# Patient Record
Sex: FEMALE | Race: WHITE | NOT HISPANIC OR LATINO
[De-identification: names, ages, dates, MRNs, and addresses within clinical notes are randomized per-mention and may not be internally consistent; named-entity substitution may affect disease eponyms.]

---

## 2019-01-07 ENCOUNTER — APPOINTMENT (OUTPATIENT)
Dept: INTERNAL MEDICINE | Facility: CLINIC | Age: 59
End: 2019-01-07

## 2019-02-08 ENCOUNTER — APPOINTMENT (OUTPATIENT)
Dept: INTERNAL MEDICINE | Facility: CLINIC | Age: 59
End: 2019-02-08
Payer: MEDICAID

## 2019-02-08 ENCOUNTER — TRANSCRIPTION ENCOUNTER (OUTPATIENT)
Age: 59
End: 2019-02-08

## 2019-02-08 VITALS
DIASTOLIC BLOOD PRESSURE: 77 MMHG | SYSTOLIC BLOOD PRESSURE: 114 MMHG | WEIGHT: 114 LBS | HEART RATE: 62 BPM | HEIGHT: 65 IN | BODY MASS INDEX: 18.99 KG/M2 | TEMPERATURE: 97.8 F | OXYGEN SATURATION: 98 %

## 2019-02-08 DIAGNOSIS — Z80.0 FAMILY HISTORY OF MALIGNANT NEOPLASM OF DIGESTIVE ORGANS: ICD-10-CM

## 2019-02-08 DIAGNOSIS — Z13.820 ENCOUNTER FOR SCREENING FOR OSTEOPOROSIS: ICD-10-CM

## 2019-02-08 DIAGNOSIS — Z83.3 FAMILY HISTORY OF DIABETES MELLITUS: ICD-10-CM

## 2019-02-08 DIAGNOSIS — M47.812 SPONDYLOSIS W/OUT MYELOPATHY OR RADICULOPATHY, CERVICAL REGION: ICD-10-CM

## 2019-02-08 DIAGNOSIS — M21.619 BUNION OF UNSPECIFIED FOOT: ICD-10-CM

## 2019-02-08 PROCEDURE — 99203 OFFICE O/P NEW LOW 30 MIN: CPT

## 2019-02-11 NOTE — HEALTH RISK ASSESSMENT
[Patient reported mammogram was abnormal] : Patient reported mammogram was abnormal [Patient reported PAP Smear was normal] : Patient reported PAP Smear was normal [Patient reported colonoscopy was normal] : Patient reported colonoscopy was normal [MammogramDate] : 8/6/18 [MammogramComments] : R breast calcifications [PapSmearDate] : 2017 [BoneDensityDate] : never [ColonoscopyDate] : age 52

## 2019-02-11 NOTE — HISTORY OF PRESENT ILLNESS
[FreeTextEntry1] : Establish care, needs prescription for f/u mammogram [de-identified] : Last mammogram (first in a few years) showed calcifications, needs prescription for follow-up mammo, which she has already scheduled.

## 2019-02-12 ENCOUNTER — MEDICATION RENEWAL (OUTPATIENT)
Age: 59
End: 2019-02-12

## 2019-06-28 ENCOUNTER — APPOINTMENT (OUTPATIENT)
Dept: INTERNAL MEDICINE | Facility: CLINIC | Age: 59
End: 2019-06-28
Payer: MEDICAID

## 2019-06-28 VITALS
SYSTOLIC BLOOD PRESSURE: 120 MMHG | BODY MASS INDEX: 19.49 KG/M2 | WEIGHT: 117 LBS | HEIGHT: 65 IN | DIASTOLIC BLOOD PRESSURE: 80 MMHG | TEMPERATURE: 98.2 F | HEART RATE: 57 BPM | OXYGEN SATURATION: 99 %

## 2019-06-28 PROCEDURE — 99214 OFFICE O/P EST MOD 30 MIN: CPT | Mod: 25

## 2019-06-28 PROCEDURE — 36415 COLL VENOUS BLD VENIPUNCTURE: CPT

## 2019-07-23 LAB
ANA SER IF-ACNC: NEGATIVE
CRP SERPL-MCNC: <0.1 MG/DL
ERYTHROCYTE [SEDIMENTATION RATE] IN BLOOD BY WESTERGREN METHOD: 14 MM/HR
RHEUMATOID FACT SER QL: <10 IU/ML

## 2020-03-09 ENCOUNTER — APPOINTMENT (OUTPATIENT)
Dept: INTERNAL MEDICINE | Facility: CLINIC | Age: 60
End: 2020-03-09

## 2020-04-05 ENCOUNTER — RX RENEWAL (OUTPATIENT)
Age: 60
End: 2020-04-05

## 2020-06-05 ENCOUNTER — APPOINTMENT (OUTPATIENT)
Dept: INTERNAL MEDICINE | Facility: CLINIC | Age: 60
End: 2020-06-05

## 2020-07-09 ENCOUNTER — APPOINTMENT (OUTPATIENT)
Dept: DERMATOLOGY | Facility: CLINIC | Age: 60
End: 2020-07-09

## 2020-07-17 ENCOUNTER — APPOINTMENT (OUTPATIENT)
Dept: INTERNAL MEDICINE | Facility: CLINIC | Age: 60
End: 2020-07-17
Payer: MEDICAID

## 2020-07-17 VITALS
SYSTOLIC BLOOD PRESSURE: 114 MMHG | HEIGHT: 65 IN | WEIGHT: 117 LBS | DIASTOLIC BLOOD PRESSURE: 82 MMHG | BODY MASS INDEX: 19.49 KG/M2 | TEMPERATURE: 98.2 F

## 2020-07-17 VITALS — OXYGEN SATURATION: 98 % | HEART RATE: 74 BPM

## 2020-07-17 DIAGNOSIS — M25.542 PAIN IN JOINTS OF RIGHT HAND: ICD-10-CM

## 2020-07-17 DIAGNOSIS — M25.552 PAIN IN LEFT HIP: ICD-10-CM

## 2020-07-17 DIAGNOSIS — R29.898 OTHER SYMPTOMS AND SIGNS INVOLVING THE MUSCULOSKELETAL SYSTEM: ICD-10-CM

## 2020-07-17 DIAGNOSIS — J34.0 ABSCESS, FURUNCLE AND CARBUNCLE OF NOSE: ICD-10-CM

## 2020-07-17 DIAGNOSIS — M25.541 PAIN IN JOINTS OF RIGHT HAND: ICD-10-CM

## 2020-07-17 DIAGNOSIS — Z60.2 PROBLEMS RELATED TO LIVING ALONE: ICD-10-CM

## 2020-07-17 DIAGNOSIS — R92.1 MAMMOGRAPHIC CALCIFICATION FOUND ON DIAGNOSTIC IMAGING OF BREAST: ICD-10-CM

## 2020-07-17 PROCEDURE — 99212 OFFICE O/P EST SF 10 MIN: CPT | Mod: 25

## 2020-07-17 PROCEDURE — 99396 PREV VISIT EST AGE 40-64: CPT | Mod: 25

## 2020-07-17 PROCEDURE — 36415 COLL VENOUS BLD VENIPUNCTURE: CPT

## 2020-07-17 SDOH — SOCIAL STABILITY - SOCIAL INSECURITY: PROBLEMS RELATED TO LIVING ALONE: Z60.2

## 2020-07-28 ENCOUNTER — RESULT REVIEW (OUTPATIENT)
Age: 60
End: 2020-07-28

## 2020-07-28 ENCOUNTER — APPOINTMENT (OUTPATIENT)
Dept: ORTHOPEDIC SURGERY | Facility: CLINIC | Age: 60
End: 2020-07-28
Payer: MEDICAID

## 2020-07-28 ENCOUNTER — OUTPATIENT (OUTPATIENT)
Dept: OUTPATIENT SERVICES | Facility: HOSPITAL | Age: 60
LOS: 1 days | End: 2020-07-28
Payer: COMMERCIAL

## 2020-07-28 ENCOUNTER — TRANSCRIPTION ENCOUNTER (OUTPATIENT)
Age: 60
End: 2020-07-28

## 2020-07-28 VITALS
BODY MASS INDEX: 19.16 KG/M2 | HEIGHT: 65 IN | DIASTOLIC BLOOD PRESSURE: 70 MMHG | TEMPERATURE: 97.9 F | SYSTOLIC BLOOD PRESSURE: 120 MMHG | WEIGHT: 115 LBS

## 2020-07-28 PROCEDURE — 73502 X-RAY EXAM HIP UNI 2-3 VIEWS: CPT

## 2020-07-28 PROCEDURE — 99204 OFFICE O/P NEW MOD 45 MIN: CPT

## 2020-07-28 PROCEDURE — 73502 X-RAY EXAM HIP UNI 2-3 VIEWS: CPT | Mod: 26,LT

## 2020-07-28 PROCEDURE — 72020 X-RAY EXAM OF SPINE 1 VIEW: CPT

## 2020-07-28 PROCEDURE — 72020 X-RAY EXAM OF SPINE 1 VIEW: CPT | Mod: 26

## 2020-07-28 NOTE — DISCUSSION/SUMMARY
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

## 2020-07-28 NOTE — REVIEW OF SYSTEMS
[Joint Pain] : joint pain [Joint Swelling] : joint swelling [Joint Stiffness] : joint stiffness [Feeling Tired] : fatigue [Sleep Disturbances] : ~T sleep disturbances [Heartburn] : heartburn [Feeling Weak] : feeling weak [Muscle Weakness] : muscle weakness [Negative] : Heme/Lymph

## 2020-07-28 NOTE — CONSULT LETTER
[Dear  ___] : Dear  [unfilled], [Consult Letter:] : I had the pleasure of evaluating your patient, [unfilled]. [Consult Closing:] : Thank you very much for allowing me to participate in the care of this patient.  If you have any questions, please do not hesitate to contact me. [Please see my note below.] : Please see my note below. [Sincerely,] : Sincerely, [FreeTextEntry3] : Tavo Sahu MD\par Orthopaedic Surgery - Adult Reconstruction\par [facility] [place]\par [address] [place] [address]\par p. [phone]\par f. [phone]

## 2020-07-28 NOTE — HISTORY OF PRESENT ILLNESS
[de-identified] : 59y/o female presenting with left hip pain of about 2-3 years' duration. No history of injury or other inciting event that she can recall. Pain mostly in the groin, exacerbated by activity but also with occasional nighttime awakenings. Does her own HEP consisting of walking, biking, and gentle yoga. Not a believer in medications. No other invasive treatments. No low back pain or radiating symptoms. Unlimited exercise tolerance. She does report the progressive development of shin muscle atrophy and loss of active ankle dorsiflexion over the past 6 months. No numbness/tingling. \par \par She is an artist. Only significant known PMH is anxiety on citalopram. [Worsening] : worsening [4] : a current pain level of 4/10 [Walking] : worsened by walking [Exercise Regimen] : relieved by exercise regimen [Hip Movement] : worsened by hip movement [de-identified] : achy, sometimes sharp [Rest] : relieved by rest

## 2020-07-28 NOTE — PHYSICAL EXAM
[de-identified] : General appearance: well nourished and hydrated, pleasant, alert and oriented x 3, cooperative.  \par HEENT: normocephalic, EOM intact, wearing mask, external auditory canal clear.  \par Cardiovascular: no lower leg edema, no varicosities, dorsalis pedis pulses palpable and symmetric.  \par Lymphatics: no palpable lymphadenopathy, no lymphedema.  \par Neurologic: sensation is normal, 4/5 weakness in ankle dorsiflexion otherwise 5/5 UE/LE muscle strength, patella tendon reflexes present and symmetric.  \par Dermatologic: skin moist, warm, no rash.  \par Spine: cervical spine with normal lordosis and painless range of motion, thoracic spine with normal kyphosis and painless range of motion, lumbosacral spine with normal lordosis and painless range of motion.  No tenderness to palpation along midline spine and paraspinal musculature.  Sacroiliac joints nontender bilaterally. Negative SLR and crossed SLR tests bilaterally.\par Gait: mild steppage, no antalgia.\par \par Left hip:\par - Skin intact, no scars or other prior surgical incisions noted\par - No swelling/ecchymosis\par - No specific tenderness on palpation\par - ROM: 100 flexion, 0 extension, 20 adduction, 40 abduction, 10 internal rotation, 60 external rotation\par - KENIA painful anteriorly\par - FADIR painful anteriorly\par - Dean negative\par - Stinchfield positive\par - Flexor power 5/5\par - Abductor power 5/5\par \par Right hip:\par - Skin intact, no scars or other prior surgical incisions noted\par - No swelling/ecchymosis\par - No specific tenderness on palpation\par - ROM: 120 flexion, 0 extension, 20 adduction, 50 abduction, 15 internal rotation, 75 external rotation\par - KENIA painless\par - FADIR painless\par - Dean negative\par - Stinchfield negative\par - Flexor power 5/5\par - Abductor power 5/5\par \par Bilateral legs/ankles: atrophy noted of the anterior compartments. Unable to active dorsiflex ankles to neutral; cavus foot positioning noted when attempted. No leg/ankle pain. [de-identified] : A lateral view of the lumbosacral spine, weightbearing AP pelvis, and 2 additional views (frog lateral and false profile) of the right hip were obtained today.\par \par The spine demonstrates normal sagittal alignment without evidence of listhesis. There is mild multilevel spondylosis with associated facet arthrosis.\par \par There is no pelvic obliquity.\par \par The bilateral hips demonstrate normal alignment. There is mild right hip arthritis and moderate left hip arthritis. In both sides there is a centrally erosive pattern. Both acetabuli appear mildly dysplastic, more so the left. There is no radiographic right hip osteonecrosis. Left hip with subtle femoral head subchondral collapse. \par \par The bilateral sacroiliac joints appear normal without arthrosis.

## 2020-07-29 ENCOUNTER — APPOINTMENT (OUTPATIENT)
Dept: ORTHOPEDIC SURGERY | Facility: CLINIC | Age: 60
End: 2020-07-29

## 2020-07-30 ENCOUNTER — APPOINTMENT (OUTPATIENT)
Dept: ORTHOPEDIC SURGERY | Facility: CLINIC | Age: 60
End: 2020-07-30
Payer: MEDICAID

## 2020-07-30 VITALS — TEMPERATURE: 95.9 F

## 2020-07-30 PROCEDURE — 72100 X-RAY EXAM L-S SPINE 2/3 VWS: CPT

## 2020-07-30 PROCEDURE — 99214 OFFICE O/P EST MOD 30 MIN: CPT

## 2020-07-30 NOTE — PHYSICAL EXAM
[de-identified] : General: No acute distress, conversant, well-nourished.\par Head: Normocephalic, atraumatic\par Neck: trachea midline, FROM\par Heart: normotensive and normal rate and rhythm\par Lungs: No labored breathing\par Skin: No abrasions, no rashes, no edema\par Psych: Alert and oriented to person, place and time\par Extremities: no peripheral edema or digital cyanosis\par Gait: Steppage gait.  Difficulty with tandem gait. \par Vascular: warm and well perfused distally, palpable distal pulses\par \par MSK:\par Spine: \par No tenderness to palpation.  No step-off, no deformity.\par \par NEURO:\par Sensation \par          Left           \par C5     2/2               \par C6     2/2               \par C7     2/2               \par C8     2/2              \par T1     2/2             \par \par          Right         \par C5     2/2               \par C6     2/2               \par C7     2/2               \par C8     2/2              \par T1     2/2      \par \par Motor: \par                                                Left             \par C5 (deltoid abduction)             5/5               \par C6 (biceps flexion)                   5/5                \par C7 (triceps extension)             5/5               \par C8 (finger flexion)                     5/5               \par T1 (interosseous)                     5/5           \par \par                                                Right           \par C5 (deltoid abduction)             5/5               \par C6 (biceps flexion)                   5/5                \par C7 (triceps extension)             5/5               \par C8 (finger flexion)                     5/5               \par T1 (interosseous)                     5/5                     \par \par Sensation \par Left L2  -  2/2            \par Left L3  -  2/2\par Left L4  -  2/2\par Left L5  -  2/2\par Left S1  -  2/2\par \par Right L2  -  2/2            \par Right L3  -  2/2\par Right L4  -  2/2\par Right L5  -  2/2\par Right S1  -  2/2\par \par Motor: \par Left L2 (hip flexion)                            5/5                \par Left L3 (knee extension)                   5/5                \par Left L4 (ankle dorsiflexion)                 5/5                \par Left L5 (long toe extensor)                3/5                \par Left S1 (ankle plantar flexion)           3/5\par \par Right L2 (hip flexion)                            5/5                \par Right L3 (knee extension)                   5/5                \par Right L4 (ankle dorsiflexion)                 5/5                \par Right L5 (long toe extensor)                3/5                \par Right S1 (ankle plantar flexion)           3/5\par \par Reflexes: Normal and symmetric\par Negative Spurling’s test.  Negative Murphy’s reflex.  \par Negative clonus.  Down-going Babinski.\par  [de-identified] : Lumbar AP, lateral, flexion and extension radiographs obtained in the office today shows no fracture or dislocation.  Mild lumbar spondylosis.  No instability on dynamic series.  Mild dextroscoliosis.   \par

## 2020-07-30 NOTE — CONSULT LETTER
[Dear  ___] : Dear  [unfilled], [Please see my note below.] : Please see my note below. [Consult Letter:] : I had the pleasure of evaluating your patient, [unfilled]. [Consult Closing:] : Thank you very much for allowing me to participate in the care of this patient.  If you have any questions, please do not hesitate to contact me. [Sincerely,] : Sincerely, [FreeTextEntry3] : Chauncey Gillespie MD\par Ortho Spine Surgery\par [facility]\par [address] [facility] [address]\[place] [address]\par p: [phone]\par f: [phone]

## 2020-07-30 NOTE — HISTORY OF PRESENT ILLNESS
[de-identified] : 60 year old female referred by Dr. Sahu with bilateral lower extremity paresthesias and bilateral foot drop.  She reports these symptoms have developed over the last 6 months to 1 year.  She is a  by trade and has not noticed any fine motor deficits or upper extremity symptoms. She has bit been taking any medications for pain.  She has hip pain for which she sees Dr. Sahu.  She also complains of low back pain.  She denies any saddle anesthesia or bowel/bladder symptoms

## 2020-07-30 NOTE — CONSULT LETTER
[Dear  ___] : Dear  [unfilled], [Consult Letter:] : I had the pleasure of evaluating your patient, [unfilled]. [Please see my note below.] : Please see my note below. [Consult Closing:] : Thank you very much for allowing me to participate in the care of this patient.  If you have any questions, please do not hesitate to contact me. [Sincerely,] : Sincerely, [FreeTextEntry3] : Chauncey Gillespie MD\par Ortho Spine Surgery\par [facility]\par [address] [facility] [address]\[place] [address]\par p: [phone]\par f: [phone]

## 2020-07-30 NOTE — PHYSICAL EXAM
[de-identified] : General: No acute distress, conversant, well-nourished.\par Head: Normocephalic, atraumatic\par Neck: trachea midline, FROM\par Heart: normotensive and normal rate and rhythm\par Lungs: No labored breathing\par Skin: No abrasions, no rashes, no edema\par Psych: Alert and oriented to person, place and time\par Extremities: no peripheral edema or digital cyanosis\par Gait: Steppage gait.  Difficulty with tandem gait. \par Vascular: warm and well perfused distally, palpable distal pulses\par \par MSK:\par Spine: \par No tenderness to palpation.  No step-off, no deformity.\par \par NEURO:\par Sensation \par          Left           \par C5     2/2               \par C6     2/2               \par C7     2/2               \par C8     2/2              \par T1     2/2             \par \par          Right         \par C5     2/2               \par C6     2/2               \par C7     2/2               \par C8     2/2              \par T1     2/2      \par \par Motor: \par                                                Left             \par C5 (deltoid abduction)             5/5               \par C6 (biceps flexion)                   5/5                \par C7 (triceps extension)             5/5               \par C8 (finger flexion)                     5/5               \par T1 (interosseous)                     5/5           \par \par                                                Right           \par C5 (deltoid abduction)             5/5               \par C6 (biceps flexion)                   5/5                \par C7 (triceps extension)             5/5               \par C8 (finger flexion)                     5/5               \par T1 (interosseous)                     5/5                     \par \par Sensation \par Left L2  -  2/2            \par Left L3  -  2/2\par Left L4  -  2/2\par Left L5  -  2/2\par Left S1  -  2/2\par \par Right L2  -  2/2            \par Right L3  -  2/2\par Right L4  -  2/2\par Right L5  -  2/2\par Right S1  -  2/2\par \par Motor: \par Left L2 (hip flexion)                            5/5                \par Left L3 (knee extension)                   5/5                \par Left L4 (ankle dorsiflexion)                 5/5                \par Left L5 (long toe extensor)                3/5                \par Left S1 (ankle plantar flexion)           3/5\par \par Right L2 (hip flexion)                            5/5                \par Right L3 (knee extension)                   5/5                \par Right L4 (ankle dorsiflexion)                 5/5                \par Right L5 (long toe extensor)                3/5                \par Right S1 (ankle plantar flexion)           3/5\par \par Reflexes: Normal and symmetric\par Negative Spurling’s test.  Negative Murphy’s reflex.  \par Negative clonus.  Down-going Babinski.\par  [de-identified] : Lumbar AP, lateral, flexion and extension radiographs obtained in the office today shows no fracture or dislocation.  Mild lumbar spondylosis.  No instability on dynamic series.  Mild dextroscoliosis.   \par

## 2020-07-30 NOTE — ASSESSMENT
[FreeTextEntry1] : 60 year old female presents with low back pain and paresthesias into her lower extremities with worsening foot drops developing over last 6 months to 1 year.  She was given a referral for a lumbar MRI.  She already has a referral for PT and to obtain foot drop splints.  She has an appointment with neurologist Dr. Makayla Saavedra.  She will followup once her MRI has been completed and after her consultation with Dr. Saavedra.  She knows to call with any questions or concerns or if her symptoms acutely worsen.

## 2020-07-30 NOTE — HISTORY OF PRESENT ILLNESS
[de-identified] : 60 year old female referred by Dr. Sahu with bilateral lower extremity paresthesias and bilateral foot drop.  She reports these symptoms have developed over the last 6 months to 1 year.  She is a  by trade and has not noticed any fine motor deficits or upper extremity symptoms. She has bit been taking any medications for pain.  She has hip pain for which she sees Dr. Sahu.  She also complains of low back pain.  She denies any saddle anesthesia or bowel/bladder symptoms

## 2020-08-07 ENCOUNTER — APPOINTMENT (OUTPATIENT)
Dept: NEUROLOGY | Facility: CLINIC | Age: 60
End: 2020-08-07
Payer: MEDICAID

## 2020-08-07 VITALS
BODY MASS INDEX: 19.16 KG/M2 | DIASTOLIC BLOOD PRESSURE: 77 MMHG | SYSTOLIC BLOOD PRESSURE: 119 MMHG | TEMPERATURE: 98.2 F | HEART RATE: 58 BPM | OXYGEN SATURATION: 99 % | WEIGHT: 115 LBS | HEIGHT: 65 IN

## 2020-08-07 PROCEDURE — 99204 OFFICE O/P NEW MOD 45 MIN: CPT

## 2020-08-07 NOTE — DISCUSSION/SUMMARY
[FreeTextEntry1] : Bilateral foot drop.\par \par Etiology not clear to me.  Would be strange to have bilateral peroneal neuropathies especially with intact eversion.  No other LE weakness or sensory issues to suggest myelopathy or radiculopathy.  ?Myopathy.\par \par -L spine MRI\par -EMG/NCS\par -Agree with PT/foot splints

## 2020-08-07 NOTE — REVIEW OF SYSTEMS
[FreeTextEntry1] : see intake sheet, reviewed with patient, all unchecked systems reviewed and negative

## 2020-08-07 NOTE — PHYSICAL EXAM
[FreeTextEntry1] : Physical Exam\par Constitutional: no apparent distress\par Psychiatric: normal affect, euthyhmic, alert and oriented x 3\par HEENT: moist mucous membranes, oropharynx clear\par Neck: supple, no lymphadenopathy\par Respiratory: clear to auscultation bilaterally, no crackles or wheezing\par Cardiovascular: regular rate and rhythm, normal S1/S2, no murmurs, no edema\par GI: soft, non-tender, non-distended, bowel sounds present; no hepatosplenomegaly on palpation\par Musculoskeletal: extremities warm, well-perfused, normal range of motion.  +tibialis anterior atrophy bilaterally.\par Skin: no rashes, bruises, or lesions\par \par Neurologic Exam:\par Mental Status: awake and alert, speech fluent and prosodic with no paraphasic errors\par Cranial Nerves: I: deferred; II: pupils equal round and reactive, visual fields full to confrontation III, IV, VI: extraocular movements full with no nystagmus; V: facial sensation intact and symmetric; VII: facial power symmetric; VIII: hearing intact to voice; IX/X: palate elevates symmetrically, no dysarthria; XI: shoulder shrug symmetric; XII: tongue protrudes midline\par Motor: normal bulk and tone, no orbiting or pronator drift, power 5/5 to confrontation throughout including shoulder abduction, elbow flexion and extension, wrist flexion and extension, hip flexion, knee flexion and extension.  Dorsiflexion 4/5 bilaterally, inversion 5/5 bilaterally, eversion 4/5 on L and 5/5 on R, plantarflexion 5/5 bilaterally.\par Sensation: intact to light touch, vibration, temperature, and proprioception in distal lower extremities bilaterally\par Coordination: finger-nose-finger intact bilaterally\par Reflexes: 2+ biceps, triceps, brachioradialis, patella.  Toes downgoing bilaterally, no clonus\par Gait: narrow base, normal stance, steppage gait, able to walk on heels but not toes\par

## 2020-08-07 NOTE — CONSULT LETTER
[Consult Letter:] : I had the pleasure of evaluating your patient, [unfilled]. [Dear  ___] : Dear  [unfilled], [Consult Closing:] : Thank you very much for allowing me to participate in the care of this patient.  If you have any questions, please do not hesitate to contact me. [Please see my note below.] : Please see my note below. [Sincerely,] : Sincerely, [FreeTextEntry3] : Makayla Saavedra MD [FreeTextEntry2] : Tavo Sahu MD

## 2020-08-07 NOTE — HISTORY OF PRESENT ILLNESS
[FreeTextEntry1] : 60-year-old woman referred by Dr. Sahu for evaluation of bilateral foot drop.  \par \par She is unsure exactly when the symptoms started but thinks that it was within the past year.  She first noticed that her feet made a "slapping" sound when she was wearing flat shoes but not heels, and has been wearing heels most of the time as a result. More recently she has noticed that she cannot lift her feet.  She thought that this was related to longstanding hip pain so she presented to Dr. Sahu for evaluation, who referred her here.\par \par Occasional mild back pain but non-radiating.  Occasional neck pain, also non-radiating.  No weakness in the proximal legs, no difficulty walking other than the slapping issue.  No numbness, tingling in arms or legs.

## 2020-08-13 ENCOUNTER — APPOINTMENT (OUTPATIENT)
Dept: ORTHOPEDIC SURGERY | Facility: CLINIC | Age: 60
End: 2020-08-13
Payer: MEDICAID

## 2020-08-13 PROCEDURE — 99213 OFFICE O/P EST LOW 20 MIN: CPT | Mod: 95

## 2020-08-17 ENCOUNTER — APPOINTMENT (OUTPATIENT)
Dept: NEUROLOGY | Facility: CLINIC | Age: 60
End: 2020-08-17
Payer: MEDICAID

## 2020-08-17 VITALS
HEART RATE: 60 BPM | TEMPERATURE: 98.6 F | BODY MASS INDEX: 19.33 KG/M2 | SYSTOLIC BLOOD PRESSURE: 124 MMHG | DIASTOLIC BLOOD PRESSURE: 75 MMHG | WEIGHT: 116 LBS | RESPIRATION RATE: 16 BRPM | OXYGEN SATURATION: 100 % | HEIGHT: 65 IN

## 2020-08-17 PROCEDURE — 95912 NRV CNDJ TEST 11-12 STUDIES: CPT

## 2020-08-17 PROCEDURE — 99214 OFFICE O/P EST MOD 30 MIN: CPT | Mod: 25

## 2020-08-17 PROCEDURE — 95885 MUSC TST DONE W/NERV TST LIM: CPT

## 2020-08-17 NOTE — PROCEDURE
[FreeTextEntry1] : Nerve Conduction and Electromyography Report [FreeTextEntry3] : Electro Physiologic Findings:\par \par Limb temperature was monitored and maintained at approximately 30 – 34° C in the lower extremities, and 32 – 36° C in the upper extremities.\par \par The right superficial fibular sensory amplitude was low, while the left was normal. The left sural and radial sensory responses were normal. \par \par The left median motor response was normal. The fibular motor responses at the EDB were largely unremarkable, other than borderline slowing below the fibular head. The fibular motor amplitudes at the tibialis anterior were low bilaterally. The tibial motor responses were normal bilaterally and symmetric. \par \par The right fibular F-waves were mildly prolonged; while the left was normal. The left median and bilateral tibial F-waves were normal. \par \par Needle electromyography was performed on select left upper and bilateral lower extremity appendicular muscles. In the bilateral tibialis anterior muscles, there was significant spontaneous activity, decreased insertional activity, and small motor units with early/full recruitment pattern. The other muscles tested were all normal without evidence of myopathy or active or chronic denervation. 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 \par \par There was also a suggestion of a superimposed mild entrapment of the right common fibular nerve below the knee as well, although that is not likely significantly contributing to the weakness in dorsiflexion. \par \par There was no evidence of polyneuropathy, or myopathy in any other muscles tested.

## 2020-08-17 NOTE — ASSESSMENT
[FreeTextEntry1] : 60 year old female with low back pain and bilateral foot drop.  She had a recent lumbar MRI that showed no compression of the neural elements. She saw Dr. Makayla Saavedra a neurologist who referred her for an EMG by Dr. Fierro.  There is no indication for spine intervention.  The patient will followup once she has completed her EMG with Dr. Fierro.  She knows to call with any questions or concerns or if her symptoms acutely worsen.

## 2020-08-17 NOTE — HISTORY OF PRESENT ILLNESS
[FreeTextEntry1] : Referred by Dr. Saavedra for bilateral foot weakness, mostly in dorsiflexion of ankles\par Present for at least 6 months and probably closer to a year\par She also has atrophy in the anterior lower leg but she didn't notice it until it was pointe out by Dr. Saavedra\par She has mild low back pain without radiation\par There is no definite numbness or tingling although she does say the feet "feel funny"\par She also has some mild weakness in the arms although not as much as the feet\par \par No family history of neuromuscular disease \par \par MRI L Spine reviewed - unremarkable\par \par ROS: no urinary incontinence; + hip pain

## 2020-08-17 NOTE — PHYSICAL EXAM
[FreeTextEntry1] : Gen: appears well, well-nourished, no acute distress\par \par MS: awake, alert, oriented, speech fluent, comprehension intact, good fund of knowledge, recent and remote memory intact, attention intact\par \par CN: PERRL, EOMI, visual fields full, facial strength and sensation intact and symmetric, palate elevation symmetric, tongue midline, no tongue atrophy or fasciculations\par \par Motor: Moderate atrophy of tibialis anterior b/l\par Ankle dorsiflexion 4/5, EHL and EDB 4/5, otherwise 5/5 throughout \par Normal tone \par \par Sensory: Vibration mildly reduced at toes > ankles\par \par Reflexes: 2+ symmetric throughout, no Murphy’s sign, plantar responses flexor bilaterally\par \par Coordination: no dysmetria on finger to nose, Romberg negative\par \par Gait: mildly steppage, unable to walk on heels

## 2020-08-17 NOTE — ASSESSMENT
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

## 2020-08-17 NOTE — HISTORY OF PRESENT ILLNESS
[de-identified] : 60 year old female bilateral lower extremity paresthesias and bilateral foot drop here for followup.  Since her last visit she has seen a neurologist, Dr. Makayla Saavedra who has referred her for an EMG by Dr. Fierro.  The patient continues to have low back pain but denies radicular pain, saddle anesthesia or bowel/bladder symptoms.   She has not been taking any medications for pain.  He symptoms have progressed over 6 months to 1 year. She is here to review her lumbar MRI.

## 2020-08-17 NOTE — PHYSICAL EXAM
[de-identified] : Lumbar MRI (8/11/20): No acute fracture or dislocation.  5 degrees of dextroscoliosis.  Lumbar spondylosis with multilevel disc bulges. No spinal stenosis.  Facet arthropathy.  \par \par Lumbar AP, lateral, flexion and extension radiographs (8/7/20) shows no fracture or dislocation.  Mild lumbar spondylosis.  No instability on dynamic series.  Mild dextroscoliosis.   \par  [de-identified] : General: No acute distress, conversant, well-nourished.\par Head: Normocephalic, atraumatic\par Neck: trachea midline, FROM\par Heart: normotensive and normal rate and rhythm\par Lungs: No labored breathing\par Skin: No abrasions, no rashes, no edema\par Psych: Alert and oriented to person, place and time\par Extremities: no peripheral edema or digital cyanosis\par Gait: Steppage gait.  Difficulty with tandem gait. \par Vascular: warm and well perfused distally, palpable distal pulses\par \par MSK:\par Spine: \par No tenderness to palpation.  No step-off, no deformity.\par \par NEURO:\par Sensation \par          Left           \par C5     2/2               \par C6     2/2               \par C7     2/2               \par C8     2/2              \par T1     2/2             \par \par          Right         \par C5     2/2               \par C6     2/2               \par C7     2/2               \par C8     2/2              \par T1     2/2      \par \par Motor: \par                                                Left             \par C5 (deltoid abduction)             5/5               \par C6 (biceps flexion)                   5/5                \par C7 (triceps extension)             5/5               \par C8 (finger flexion)                     5/5               \par T1 (interosseous)                     5/5           \par \par                                                Right           \par C5 (deltoid abduction)             5/5               \par C6 (biceps flexion)                   5/5                \par C7 (triceps extension)             5/5               \par C8 (finger flexion)                     5/5               \par T1 (interosseous)                     5/5                     \par \par Sensation \par Left L2  -  2/2            \par Left L3  -  2/2\par Left L4  -  2/2\par Left L5  -  2/2\par Left S1  -  2/2\par \par Right L2  -  2/2            \par Right L3  -  2/2\par Right L4  -  2/2\par Right L5  -  2/2\par Right S1  -  2/2\par \par Motor: \par Left L2 (hip flexion)                            5/5                \par Left L3 (knee extension)                   5/5                \par Left L4 (ankle dorsiflexion)                 5/5                \par Left L5 (long toe extensor)                3/5                \par Left S1 (ankle plantar flexion)           3/5\par \par Right L2 (hip flexion)                            5/5                \par Right L3 (knee extension)                   5/5                \par Right L4 (ankle dorsiflexion)                 5/5                \par Right L5 (long toe extensor)                3/5                \par Right S1 (ankle plantar flexion)           3/5\par \par Reflexes: Normal and symmetric\par Negative Spurling’s test.  Negative Murphy’s reflex.  \par Negative clonus.  Down-going Babinski.\par

## 2020-08-19 ENCOUNTER — APPOINTMENT (OUTPATIENT)
Dept: ORTHOPEDIC SURGERY | Facility: CLINIC | Age: 60
End: 2020-08-19

## 2020-09-08 ENCOUNTER — TRANSCRIPTION ENCOUNTER (OUTPATIENT)
Age: 60
End: 2020-09-08

## 2020-09-08 ENCOUNTER — APPOINTMENT (OUTPATIENT)
Dept: INTERNAL MEDICINE | Facility: CLINIC | Age: 60
End: 2020-09-08
Payer: MEDICAID

## 2020-09-08 ENCOUNTER — NON-APPOINTMENT (OUTPATIENT)
Age: 60
End: 2020-09-08

## 2020-09-08 VITALS
SYSTOLIC BLOOD PRESSURE: 103 MMHG | HEART RATE: 70 BPM | BODY MASS INDEX: 19.16 KG/M2 | DIASTOLIC BLOOD PRESSURE: 70 MMHG | TEMPERATURE: 99 F | OXYGEN SATURATION: 97 % | WEIGHT: 115 LBS | HEIGHT: 65 IN

## 2020-09-08 DIAGNOSIS — R20.0 ANESTHESIA OF SKIN: ICD-10-CM

## 2020-09-08 DIAGNOSIS — H02.009 UNSPECIFIED ENTROPION OF UNSPECIFIED EYE, UNSPECIFIED EYELID: ICD-10-CM

## 2020-09-08 DIAGNOSIS — R82.90 UNSPECIFIED ABNORMAL FINDINGS IN URINE: ICD-10-CM

## 2020-09-08 LAB
25(OH)D3 SERPL-MCNC: 22.9 NG/ML
ALBUMIN SERPL ELPH-MCNC: 5.2 G/DL
ALP BLD-CCNC: 84 U/L
ALT SERPL-CCNC: 18 U/L
ANION GAP SERPL CALC-SCNC: 15 MMOL/L
APPEARANCE: CLEAR
AST SERPL-CCNC: 27 U/L
BACTERIA: ABNORMAL
BASOPHILS # BLD AUTO: 0.05 K/UL
BASOPHILS NFR BLD AUTO: 0.9 %
BILIRUB SERPL-MCNC: 0.3 MG/DL
BILIRUBIN URINE: NEGATIVE
BLOOD URINE: NORMAL
BUN SERPL-MCNC: 18 MG/DL
CALCIUM SERPL-MCNC: 10 MG/DL
CHLORIDE SERPL-SCNC: 101 MMOL/L
CHOLEST SERPL-MCNC: 214 MG/DL
CHOLEST/HDLC SERPL: 2.4 RATIO
CO2 SERPL-SCNC: 26 MMOL/L
COLOR: YELLOW
CREAT SERPL-MCNC: 0.76 MG/DL
EOSINOPHIL # BLD AUTO: 0.31 K/UL
EOSINOPHIL NFR BLD AUTO: 5.4 %
ESTIMATED AVERAGE GLUCOSE: 105 MG/DL
GLUCOSE QUALITATIVE U: NEGATIVE
GLUCOSE SERPL-MCNC: 100 MG/DL
HBA1C MFR BLD HPLC: 5.3 %
HCT VFR BLD CALC: 43.4 %
HDLC SERPL-MCNC: 90 MG/DL
HGB BLD-MCNC: 12.9 G/DL
HYALINE CASTS: 0 /LPF
IMM GRANULOCYTES NFR BLD AUTO: 0.2 %
KETONES URINE: NEGATIVE
LDLC SERPL CALC-MCNC: 110 MG/DL
LEUKOCYTE ESTERASE URINE: NEGATIVE
LYMPHOCYTES # BLD AUTO: 1.36 K/UL
LYMPHOCYTES NFR BLD AUTO: 23.9 %
MAN DIFF?: NORMAL
MCHC RBC-ENTMCNC: 29 PG
MCHC RBC-ENTMCNC: 29.7 GM/DL
MCV RBC AUTO: 97.5 FL
MICROSCOPIC-UA: NORMAL
MONOCYTES # BLD AUTO: 0.45 K/UL
MONOCYTES NFR BLD AUTO: 7.9 %
NEUTROPHILS # BLD AUTO: 3.52 K/UL
NEUTROPHILS NFR BLD AUTO: 61.7 %
NITRITE URINE: POSITIVE
PH URINE: 5.5
PLATELET # BLD AUTO: 204 K/UL
POTASSIUM SERPL-SCNC: 4.9 MMOL/L
PROT SERPL-MCNC: 7.4 G/DL
PROTEIN URINE: NEGATIVE
RBC # BLD: 4.45 M/UL
RBC # FLD: 13.4 %
RED BLOOD CELLS URINE: 3 /HPF
SARS-COV-2 IGG SERPL IA-ACNC: <0.1 INDEX
SARS-COV-2 IGG SERPL QL IA: NEGATIVE
SODIUM SERPL-SCNC: 142 MMOL/L
SPECIFIC GRAVITY URINE: 1.02
SQUAMOUS EPITHELIAL CELLS: 1 /HPF
TRIGL SERPL-MCNC: 72 MG/DL
TSH SERPL-ACNC: 1.01 UIU/ML
UROBILINOGEN URINE: NORMAL
WBC # FLD AUTO: 5.7 K/UL
WHITE BLOOD CELLS URINE: 6 /HPF

## 2020-09-08 PROCEDURE — 36415 COLL VENOUS BLD VENIPUNCTURE: CPT

## 2020-09-08 PROCEDURE — 93000 ELECTROCARDIOGRAM COMPLETE: CPT

## 2020-09-08 PROCEDURE — 99214 OFFICE O/P EST MOD 30 MIN: CPT | Mod: 25

## 2020-09-09 ENCOUNTER — TRANSCRIPTION ENCOUNTER (OUTPATIENT)
Age: 60
End: 2020-09-09

## 2020-09-15 ENCOUNTER — TRANSCRIPTION ENCOUNTER (OUTPATIENT)
Age: 60
End: 2020-09-15

## 2020-09-15 LAB
ALBUMIN SERPL ELPH-MCNC: 4.7 G/DL
ALP BLD-CCNC: 61 U/L
ALT SERPL-CCNC: 19 U/L
ANION GAP SERPL CALC-SCNC: 10 MMOL/L
APPEARANCE: ABNORMAL
AST SERPL-CCNC: 27 U/L
BACTERIA UR CULT: ABNORMAL
BACTERIA: ABNORMAL
BASOPHILS # BLD AUTO: 0.04 K/UL
BASOPHILS NFR BLD AUTO: 0.9 %
BILIRUB SERPL-MCNC: 0.2 MG/DL
BILIRUBIN URINE: NEGATIVE
BLOOD URINE: NEGATIVE
BUN SERPL-MCNC: 17 MG/DL
CALCIUM SERPL-MCNC: 9.6 MG/DL
CHLORIDE SERPL-SCNC: 106 MMOL/L
CO2 SERPL-SCNC: 29 MMOL/L
COLOR: YELLOW
CREAT SERPL-MCNC: 0.79 MG/DL
EOSINOPHIL # BLD AUTO: 0.34 K/UL
EOSINOPHIL NFR BLD AUTO: 7.9 %
GLUCOSE QUALITATIVE U: NEGATIVE
GLUCOSE SERPL-MCNC: 82 MG/DL
HCT VFR BLD CALC: 37.4 %
HGB BLD-MCNC: 11.5 G/DL
HYALINE CASTS: 0 /LPF
IMM GRANULOCYTES NFR BLD AUTO: 0.5 %
KETONES URINE: NEGATIVE
LEUKOCYTE ESTERASE URINE: ABNORMAL
LYMPHOCYTES # BLD AUTO: 1.45 K/UL
LYMPHOCYTES NFR BLD AUTO: 33.9 %
MAN DIFF?: NORMAL
MCHC RBC-ENTMCNC: 29.3 PG
MCHC RBC-ENTMCNC: 30.7 GM/DL
MCV RBC AUTO: 95.2 FL
MICROSCOPIC-UA: NORMAL
MONOCYTES # BLD AUTO: 0.4 K/UL
MONOCYTES NFR BLD AUTO: 9.3 %
NEUTROPHILS # BLD AUTO: 2.03 K/UL
NEUTROPHILS NFR BLD AUTO: 47.5 %
NITRITE URINE: POSITIVE
PH URINE: 6
PLATELET # BLD AUTO: 180 K/UL
POTASSIUM SERPL-SCNC: 4.1 MMOL/L
PROT SERPL-MCNC: 6.7 G/DL
PROTEIN URINE: NEGATIVE
RBC # BLD: 3.93 M/UL
RBC # FLD: 13.1 %
RED BLOOD CELLS URINE: 3 /HPF
SODIUM SERPL-SCNC: 144 MMOL/L
SPECIFIC GRAVITY URINE: 1.02
SQUAMOUS EPITHELIAL CELLS: 5 /HPF
UROBILINOGEN URINE: NORMAL
WBC # FLD AUTO: 4.28 K/UL
WHITE BLOOD CELLS URINE: 5 /HPF

## 2020-09-25 ENCOUNTER — TRANSCRIPTION ENCOUNTER (OUTPATIENT)
Age: 60
End: 2020-09-25

## 2020-09-29 ENCOUNTER — TRANSCRIPTION ENCOUNTER (OUTPATIENT)
Age: 60
End: 2020-09-29

## 2020-10-16 ENCOUNTER — TRANSCRIPTION ENCOUNTER (OUTPATIENT)
Age: 60
End: 2020-10-16

## 2020-10-16 DIAGNOSIS — Z87.440 PERSONAL HISTORY OF URINARY (TRACT) INFECTIONS: ICD-10-CM

## 2020-10-16 LAB
APPEARANCE: ABNORMAL
BACTERIA UR CULT: ABNORMAL
BACTERIA: ABNORMAL
BILIRUBIN URINE: NEGATIVE
BLOOD URINE: NEGATIVE
COLOR: YELLOW
GLUCOSE QUALITATIVE U: NEGATIVE
HYALINE CASTS: 0 /LPF
KETONES URINE: NEGATIVE
LEUKOCYTE ESTERASE URINE: NEGATIVE
MICROSCOPIC-UA: NORMAL
NITRITE URINE: NEGATIVE
PH URINE: 7.5
PROTEIN URINE: NEGATIVE
RED BLOOD CELLS URINE: 2 /HPF
SPECIFIC GRAVITY URINE: 1.01
SQUAMOUS EPITHELIAL CELLS: 0 /HPF
UROBILINOGEN URINE: NORMAL
WHITE BLOOD CELLS URINE: 7 /HPF

## 2020-10-20 ENCOUNTER — APPOINTMENT (OUTPATIENT)
Dept: ORTHOPEDIC SURGERY | Facility: CLINIC | Age: 60
End: 2020-10-20
Payer: MEDICAID

## 2020-10-20 ENCOUNTER — TRANSCRIPTION ENCOUNTER (OUTPATIENT)
Age: 60
End: 2020-10-20

## 2020-10-20 VITALS — BODY MASS INDEX: 19.16 KG/M2 | HEIGHT: 65 IN | WEIGHT: 115 LBS

## 2020-10-20 PROCEDURE — 99072 ADDL SUPL MATRL&STAF TM PHE: CPT

## 2020-10-20 PROCEDURE — 99214 OFFICE O/P EST MOD 30 MIN: CPT

## 2020-10-20 RX ORDER — SULFAMETHOXAZOLE AND TRIMETHOPRIM 800; 160 MG/1; MG/1
800-160 TABLET ORAL
Qty: 14 | Refills: 0 | Status: DISCONTINUED | COMMUNITY
Start: 2020-09-15 | End: 2020-10-20

## 2020-10-22 ENCOUNTER — TRANSCRIPTION ENCOUNTER (OUTPATIENT)
Age: 60
End: 2020-10-22

## 2020-10-22 NOTE — HISTORY OF PRESENT ILLNESS
[de-identified] : 61y/o female following up for left hip osteoarthritis and bilateral dropfoot gait. Did not do PT or begin with Tylenol/NSAIDs; instead continued with own HEP. Has been experiencing worsening left hip pain, up to 10/10 within the last week. 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 Lumbar MRI did not demonstrate any significant neural element compression. Has not yet obtained AFOs; was confused as to whether they were necessary. Reports that she has been tripping. Generally tries not to walk around outside. Overall frustrated. She is inquiring about left hip corticosteroid injection.

## 2020-10-22 NOTE — PHYSICAL EXAM
[de-identified] : General appearance: well nourished and hydrated, alert and oriented x 3, cooperative. \par HEENT: normocephalic, EOM intact, wearing mask, external auditory canal clear. \par Cardiovascular: no lower leg edema, no varicosities, dorsalis pedis pulses palpable and symmetric. \par Lymphatics: no palpable lymphadenopathy, no lymphedema. \par Neurologic: sensation is normal, 4/5 weakness in ankle dorsiflexion otherwise 5/5 UE/LE muscle strength, patella tendon reflexes present and symmetric. \par Dermatologic: skin moist, warm, no rash. \par Spine: cervical spine with normal lordosis and painless range of motion, thoracic spine with normal kyphosis and painless range of motion, lumbosacral spine with normal lordosis and painless range of motion. No tenderness to palpation along midline spine and paraspinal musculature. Sacroiliac joints nontender bilaterally. Negative SLR and crossed SLR tests bilaterally.\par Gait: mild steppage, no antalgia.\par \par Left hip:\par - No swelling\par - Mild anterior tenderness on palpation\par - ROM: 90 flexion, 0 extension, 15 adduction, 30 abduction, 0 internal rotation, 40 external rotation\par - KENIA painful anteriorly\par - FADIR painful anteriorly\par - Dean negative\par - Stinchfield positive\par - Flexor power 5/5\par - Abductor power 5/5\par \par Right hip:\par - No swelling\par - No specific tenderness on palpation\par - ROM: 130 flexion, 0 extension, 20 adduction, 50 abduction, 15 internal rotation, 75 external rotation\par - KENIA painless\par - FADIR painless\par - Dean negative\par - Stinchfield negative\par - Flexor power 5/5\par - Abductor power 5/5 [de-identified] : No new imaging today

## 2020-10-22 NOTE — DISCUSSION/SUMMARY
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 Fierro\par - HEP encouraged, written AAOS plan given\par - Tylenol + meloxicam as needed for pain; standard precautions reviewed\par - Refer to IR for left hip CSI; standard precautions reviewed\par - Cont f/u with Dr. Fierro\par - F/U with me in 6mo or as needed for persistent or worsening symptoms with repeat pelvic and left hip XRs. \par \par Concerns re: pain management, compliance, and healthcare access should be handled with sensitivity.

## 2020-10-23 ENCOUNTER — TRANSCRIPTION ENCOUNTER (OUTPATIENT)
Age: 60
End: 2020-10-23

## 2020-10-24 ENCOUNTER — LABORATORY RESULT (OUTPATIENT)
Age: 60
End: 2020-10-24

## 2020-10-27 ENCOUNTER — RESULT REVIEW (OUTPATIENT)
Age: 60
End: 2020-10-27

## 2020-10-27 ENCOUNTER — OUTPATIENT (OUTPATIENT)
Dept: OUTPATIENT SERVICES | Facility: HOSPITAL | Age: 60
LOS: 1 days | End: 2020-10-27
Payer: COMMERCIAL

## 2020-10-27 ENCOUNTER — APPOINTMENT (OUTPATIENT)
Dept: INTERVENTIONAL RADIOLOGY/VASCULAR | Facility: HOSPITAL | Age: 60
End: 2020-10-27
Payer: MEDICAID

## 2020-10-27 PROCEDURE — 20610 DRAIN/INJ JOINT/BURSA W/O US: CPT | Mod: LT

## 2020-10-27 PROCEDURE — 77002 NEEDLE LOCALIZATION BY XRAY: CPT | Mod: 26

## 2020-10-27 PROCEDURE — 77002 NEEDLE LOCALIZATION BY XRAY: CPT

## 2020-10-27 PROCEDURE — 20610 DRAIN/INJ JOINT/BURSA W/O US: CPT

## 2020-10-30 ENCOUNTER — RX RENEWAL (OUTPATIENT)
Age: 60
End: 2020-10-30

## 2020-11-09 ENCOUNTER — TRANSCRIPTION ENCOUNTER (OUTPATIENT)
Age: 60
End: 2020-11-09

## 2020-12-23 PROBLEM — Z87.440 HISTORY OF URINARY TRACT INFECTION: Status: RESOLVED | Noted: 2020-10-16 | Resolved: 2020-12-23

## 2021-01-14 ENCOUNTER — RX RENEWAL (OUTPATIENT)
Age: 61
End: 2021-01-14

## 2021-02-19 ENCOUNTER — APPOINTMENT (OUTPATIENT)
Dept: INTERNAL MEDICINE | Facility: CLINIC | Age: 61
End: 2021-02-19
Payer: MEDICAID

## 2021-02-19 DIAGNOSIS — Z01.810 ENCOUNTER FOR PREPROCEDURAL CARDIOVASCULAR EXAMINATION: ICD-10-CM

## 2021-02-19 PROCEDURE — 99213 OFFICE O/P EST LOW 20 MIN: CPT | Mod: 95

## 2021-02-19 RX ORDER — NITROFURANTOIN (MONOHYDRATE/MACROCRYSTALS) 25; 75 MG/1; MG/1
100 CAPSULE ORAL
Qty: 20 | Refills: 0 | Status: COMPLETED | COMMUNITY
Start: 2020-10-16 | End: 2021-02-19

## 2021-02-21 PROBLEM — Z01.810 PRE-OPERATIVE CARDIOVASCULAR EXAMINATION: Status: RESOLVED | Noted: 2020-09-08 | Resolved: 2021-02-21

## 2021-02-22 ENCOUNTER — TRANSCRIPTION ENCOUNTER (OUTPATIENT)
Age: 61
End: 2021-02-22

## 2021-02-22 ENCOUNTER — APPOINTMENT (OUTPATIENT)
Dept: INTERNAL MEDICINE | Facility: CLINIC | Age: 61
End: 2021-02-22
Payer: MEDICAID

## 2021-02-22 DIAGNOSIS — Z20.822 CONTACT WITH AND (SUSPECTED) EXPOSURE TO COVID-19: ICD-10-CM

## 2021-02-22 PROCEDURE — 99072 ADDL SUPL MATRL&STAF TM PHE: CPT

## 2021-03-01 ENCOUNTER — APPOINTMENT (OUTPATIENT)
Dept: NEUROLOGY | Facility: CLINIC | Age: 61
End: 2021-03-01
Payer: MEDICAID

## 2021-03-01 DIAGNOSIS — M21.379 FOOT DROP, UNSPECIFIED FOOT: ICD-10-CM

## 2021-03-01 PROCEDURE — 99214 OFFICE O/P EST MOD 30 MIN: CPT | Mod: 95

## 2021-03-01 NOTE — ASSESSMENT
[FreeTextEntry1] : Recommend switching to a dorsiflexion-assist "soft AFO" instead of standard AFO \par Trial of medical marijuana\par f/u in 3 months\par \par Discussed the risks, benefits, potential side effects, indications and data regarding the use of medical marijuana for neurologic conditions including the one(s) that the patient exhibits.\par The patient meets one or more of the conditions for which medical marijuana is approved for use in the state of [place]. \par The patient was certified as meeting criteria and may obtain the treatment after consultation with a pharmacist at the dispensary.\par

## 2021-03-01 NOTE — REASON FOR VISIT
[Follow-Up: _____] : a [unfilled] follow-up visit [Home] : at home, [unfilled] , at the time of the visit. [Medical Office: ([facility])___] : at the medical office located in  [Verbal consent obtained from patient] : the patient, [unfilled]

## 2021-03-02 ENCOUNTER — TRANSCRIPTION ENCOUNTER (OUTPATIENT)
Age: 61
End: 2021-03-02

## 2021-03-04 ENCOUNTER — TRANSCRIPTION ENCOUNTER (OUTPATIENT)
Age: 61
End: 2021-03-04

## 2021-03-11 ENCOUNTER — TRANSCRIPTION ENCOUNTER (OUTPATIENT)
Age: 61
End: 2021-03-11

## 2021-03-11 ENCOUNTER — NON-APPOINTMENT (OUTPATIENT)
Age: 61
End: 2021-03-11

## 2021-03-11 LAB
ALBUMIN MFR SERPL ELPH: 64.9 %
ALBUMIN SERPL ELPH-MCNC: 4.7 G/DL
ALBUMIN SERPL-MCNC: 4.5 G/DL
ALBUMIN/GLOB SERPL: 1.8 RATIO
ALDOLASE SERPL-CCNC: 5 U/L
ALP BLD-CCNC: 72 U/L
ALPHA1 GLOB MFR SERPL ELPH: 3.9 %
ALPHA1 GLOB SERPL ELPH-MCNC: 0.3 G/DL
ALPHA2 GLOB MFR SERPL ELPH: 8 %
ALPHA2 GLOB SERPL ELPH-MCNC: 0.6 G/DL
ALT SERPL-CCNC: 16 U/L
ANA SER IF-ACNC: NEGATIVE
ANION GAP SERPL CALC-SCNC: 15 MMOL/L
AST SERPL-CCNC: 26 U/L
B-GLOBULIN MFR SERPL ELPH: 10.5 %
B-GLOBULIN SERPL ELPH-MCNC: 0.7 G/DL
BASOPHILS # BLD AUTO: 0.05 K/UL
BASOPHILS NFR BLD AUTO: 1 %
BILIRUB SERPL-MCNC: 0.3 MG/DL
BUN SERPL-MCNC: 14 MG/DL
CALCIUM SERPL-MCNC: 9.7 MG/DL
CHLORIDE SERPL-SCNC: 104 MMOL/L
CK SERPL-CCNC: 151 U/L
CO2 SERPL-SCNC: 22 MMOL/L
CREAT SERPL-MCNC: 0.85 MG/DL
CRP SERPL-MCNC: <0.1 MG/DL
EOSINOPHIL # BLD AUTO: 0.36 K/UL
EOSINOPHIL NFR BLD AUTO: 6.9 %
ERYTHROCYTE [SEDIMENTATION RATE] IN BLOOD BY WESTERGREN METHOD: 10 MM/HR
FERRITIN SERPL-MCNC: 74 NG/ML
FOLATE SERPL-MCNC: 11.1 NG/ML
GAMMA GLOB FLD ELPH-MCNC: 0.9 G/DL
GAMMA GLOB MFR SERPL ELPH: 12.7 %
GLUCOSE SERPL-MCNC: 100 MG/DL
HCT VFR BLD CALC: 38.1 %
HGB BLD-MCNC: 11.7 G/DL
IMM GRANULOCYTES NFR BLD AUTO: 0.2 %
INTERPRETATION SERPL IEP-IMP: NORMAL
IRON SATN MFR SERPL: 28 %
IRON SERPL-MCNC: 94 UG/DL
LYMPHOCYTES # BLD AUTO: 1.46 K/UL
LYMPHOCYTES NFR BLD AUTO: 28.1 %
M PROTEIN SPEC IFE-MCNC: NORMAL
MAN DIFF?: NORMAL
MCHC RBC-ENTMCNC: 29 PG
MCHC RBC-ENTMCNC: 30.7 GM/DL
MCV RBC AUTO: 94.3 FL
MONOCYTES # BLD AUTO: 0.54 K/UL
MONOCYTES NFR BLD AUTO: 10.4 %
NEUTROPHILS # BLD AUTO: 2.77 K/UL
NEUTROPHILS NFR BLD AUTO: 53.4 %
PLATELET # BLD AUTO: 227 K/UL
POTASSIUM SERPL-SCNC: 4.1 MMOL/L
PROT SERPL-MCNC: 7 G/DL
PROT SERPL-MCNC: 7 G/DL
RBC # BLD: 4.04 M/UL
RBC # FLD: 13.1 %
SODIUM SERPL-SCNC: 142 MMOL/L
TIBC SERPL-MCNC: 337 UG/DL
TSH SERPL-ACNC: 1.2 UIU/ML
UIBC SERPL-MCNC: 243 UG/DL
VIT B12 SERPL-MCNC: 344 PG/ML
WBC # FLD AUTO: 5.19 K/UL

## 2021-03-30 ENCOUNTER — TRANSCRIPTION ENCOUNTER (OUTPATIENT)
Age: 61
End: 2021-03-30

## 2021-04-06 ENCOUNTER — TRANSCRIPTION ENCOUNTER (OUTPATIENT)
Age: 61
End: 2021-04-06

## 2021-04-07 ENCOUNTER — APPOINTMENT (OUTPATIENT)
Dept: DERMATOLOGY | Facility: CLINIC | Age: 61
End: 2021-04-07
Payer: MEDICAID

## 2021-04-07 ENCOUNTER — LABORATORY RESULT (OUTPATIENT)
Age: 61
End: 2021-04-07

## 2021-04-07 DIAGNOSIS — D48.5 NEOPLASM OF UNCERTAIN BEHAVIOR OF SKIN: ICD-10-CM

## 2021-04-07 DIAGNOSIS — L82.1 OTHER SEBORRHEIC KERATOSIS: ICD-10-CM

## 2021-04-07 PROCEDURE — 11102 TANGNTL BX SKIN SINGLE LES: CPT | Mod: 59

## 2021-04-07 PROCEDURE — 99204 OFFICE O/P NEW MOD 45 MIN: CPT | Mod: 25

## 2021-04-07 PROCEDURE — 99072 ADDL SUPL MATRL&STAF TM PHE: CPT

## 2021-04-07 PROCEDURE — 11900 INJECT SKIN LESIONS </W 7: CPT | Mod: 59

## 2021-04-08 ENCOUNTER — OUTPATIENT (OUTPATIENT)
Dept: OUTPATIENT SERVICES | Facility: HOSPITAL | Age: 61
LOS: 1 days | End: 2021-04-08
Payer: COMMERCIAL

## 2021-04-08 ENCOUNTER — RESULT REVIEW (OUTPATIENT)
Age: 61
End: 2021-04-08

## 2021-04-08 ENCOUNTER — APPOINTMENT (OUTPATIENT)
Dept: ORTHOPEDIC SURGERY | Facility: CLINIC | Age: 61
End: 2021-04-08
Payer: MEDICAID

## 2021-04-08 VITALS
DIASTOLIC BLOOD PRESSURE: 60 MMHG | HEIGHT: 65 IN | OXYGEN SATURATION: 98 % | HEART RATE: 70 BPM | SYSTOLIC BLOOD PRESSURE: 110 MMHG | BODY MASS INDEX: 19.16 KG/M2 | WEIGHT: 115 LBS

## 2021-04-08 PROCEDURE — 99214 OFFICE O/P EST MOD 30 MIN: CPT

## 2021-04-08 PROCEDURE — 73502 X-RAY EXAM HIP UNI 2-3 VIEWS: CPT

## 2021-04-08 PROCEDURE — 73502 X-RAY EXAM HIP UNI 2-3 VIEWS: CPT | Mod: 26,LT

## 2021-04-08 PROCEDURE — 99072 ADDL SUPL MATRL&STAF TM PHE: CPT

## 2021-04-08 NOTE — PHYSICAL EXAM
[de-identified] : General appearance: well nourished and hydrated, alert and oriented x 3, cooperative. \par HEENT: normocephalic, EOM intact, wearing mask, external auditory canal clear. \par Cardiovascular: no lower leg edema, no varicosities, dorsalis pedis pulses palpable and symmetric. \par Lymphatics: no palpable lymphadenopathy, no lymphedema. \par Neurologic: sensation is normal, 4/5 weakness in ankle dorsiflexion otherwise 5/5 UE/LE muscle strength, patella tendon reflexes present and symmetric. \par Dermatologic: skin moist, warm, no rash. \par Spine: cervical spine with normal lordosis and painless range of motion, thoracic spine with normal kyphosis and painless range of motion, lumbosacral spine with normal lordosis and painless range of motion. No tenderness to palpation along midline spine and paraspinal musculature. Sacroiliac joints nontender bilaterally. Negative SLR and crossed SLR tests bilaterally.\par Gait: ambulating with cane, bilateral leg braces with elastic attachments to shoelaces for dorsiflexion support.\par \par Left hip:\par - No swelling\par - No specific tenderness on palpation\par - ROM: 120 flexion, 0 extension, 15 adduction, 30 abduction, 15 internal rotation, 30 external rotation\par - KENIA painful anteriorly\par - FADIR painful anteriorly\par - Dean negative\par - Stinchfield positive\par - Flexor power 5/5\par - Abductor power 5/5\par \par Right hip:\par - No swelling\par - No specific tenderness on palpation\par - ROM: 130 flexion, 0 extension, 20 adduction, 50 abduction, 15 internal rotation, 75 external rotation\par - KENIA painless\par - FADIR painless\par - Dean negative\par - Stinchfield negative\par - Flexor power 5/5\par - Abductor power 5/5 [de-identified] : Weightbearing AP pelvis and 2 additional views (frog lateral and false profile) of the left hip were obtained today, interpreted by me, and reviewed with the patient.\par \par The left hip demonstrates normal alignment. There has been progression of osteoarthritis from the last films in July with further loss of the superior joint space and progressive sclerosis and cyst formation on both sides of the joint. Still not quite bone on bone. Marginal osteophytes. There is no radiographic osteonecrosis. \par

## 2021-04-08 NOTE — DISCUSSION/SUMMARY
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 We discussed the range of treatment options again. She is still willing to continue with conservative management. She is aware that total hip arthroplasty will likely be required for definitive management at some point down the line.\par - Cont HEP\par - Cont current AFOs\par - Tylenol + meloxicam as needed; declined gabapentin/tramadol for now\par - Refer back to IR for repeat left hip CSI; advised her to keep a pain journal for the following weeks to track efficacy\par - RTC 3mo, no new XRs needed

## 2021-04-08 NOTE — HISTORY OF PRESENT ILLNESS
[de-identified] : 4/8/21: Reports that the left hip has been gradually worsening. Did go to [facility] for PT, but at the same time was wearing uncomfortable AFOs and found the therapy to be counterproductive. Afterwards began wearing different dynamic AFOs and continuing with her own HEP, which has been more effective. Went for the L hip CSI on 10/27/20 but cannot really recall whether it was helpful or not. Still using the meloxicam with slight relief. However, walking has become more and more difficult. Needs a cane to go any further than 5min at a time. Is not really going outside for recreation at all. She is interested in trying another CSI. She reports that she is planning to begin attending classes at [facility] in August. Still planning on keeping her [facility] apt and coming back and forth as needed.\par \par 10/20/20: 61y/o female following up for left hip osteoarthritis and bilateral dropfoot gait. Did not do PT or begin with Tylenol/NSAIDs; instead continued with own HEP. Has been experiencing worsening left hip pain, up to 10/10 within the last week. 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 Lumbar MRI did not demonstrate any significant neural element compression. Has not yet obtained AFOs; was confused as to whether they were necessary. Reports that she has been tripping. Generally tries not to walk around outside. Overall frustrated. She is inquiring about left hip corticosteroid injection.

## 2021-04-09 ENCOUNTER — APPOINTMENT (OUTPATIENT)
Dept: INTERNAL MEDICINE | Facility: CLINIC | Age: 61
End: 2021-04-09
Payer: MEDICAID

## 2021-04-09 DIAGNOSIS — F41.9 ANXIETY DISORDER, UNSPECIFIED: ICD-10-CM

## 2021-04-09 PROCEDURE — 99442: CPT

## 2021-04-12 ENCOUNTER — LABORATORY RESULT (OUTPATIENT)
Age: 61
End: 2021-04-12

## 2021-04-12 ENCOUNTER — APPOINTMENT (OUTPATIENT)
Dept: INTERNAL MEDICINE | Facility: CLINIC | Age: 61
End: 2021-04-12
Payer: MEDICAID

## 2021-04-12 VITALS — TEMPERATURE: 97.8 F

## 2021-04-12 PROCEDURE — 99072 ADDL SUPL MATRL&STAF TM PHE: CPT

## 2021-04-12 PROCEDURE — 96372 THER/PROPH/DIAG INJ SC/IM: CPT

## 2021-04-12 RX ORDER — CYANOCOBALAMIN 1000 UG/ML
1000 INJECTION INTRAMUSCULAR; SUBCUTANEOUS
Qty: 0 | Refills: 0 | Status: COMPLETED | OUTPATIENT
Start: 2021-04-12

## 2021-04-12 RX ADMIN — CYANOCOBALAMIN 1000 MCG/ML: 1000 INJECTION, SOLUTION INTRAMUSCULAR at 00:00

## 2021-04-13 ENCOUNTER — TRANSCRIPTION ENCOUNTER (OUTPATIENT)
Age: 61
End: 2021-04-13

## 2021-04-14 PROBLEM — F41.9 ANXIETY: Status: ACTIVE | Noted: 2019-02-08

## 2021-04-15 ENCOUNTER — RESULT REVIEW (OUTPATIENT)
Age: 61
End: 2021-04-15

## 2021-04-15 ENCOUNTER — APPOINTMENT (OUTPATIENT)
Dept: INTERVENTIONAL RADIOLOGY/VASCULAR | Facility: HOSPITAL | Age: 61
End: 2021-04-15

## 2021-04-15 ENCOUNTER — OUTPATIENT (OUTPATIENT)
Dept: OUTPATIENT SERVICES | Facility: HOSPITAL | Age: 61
LOS: 1 days | End: 2021-04-15
Payer: COMMERCIAL

## 2021-04-15 PROCEDURE — 77002 NEEDLE LOCALIZATION BY XRAY: CPT | Mod: 26

## 2021-04-15 PROCEDURE — 20610 DRAIN/INJ JOINT/BURSA W/O US: CPT

## 2021-04-15 PROCEDURE — 77002 NEEDLE LOCALIZATION BY XRAY: CPT

## 2021-04-15 PROCEDURE — 20610 DRAIN/INJ JOINT/BURSA W/O US: CPT | Mod: LT

## 2021-04-30 ENCOUNTER — RX RENEWAL (OUTPATIENT)
Age: 61
End: 2021-04-30

## 2021-05-05 ENCOUNTER — APPOINTMENT (OUTPATIENT)
Dept: DERMATOLOGY | Facility: CLINIC | Age: 61
End: 2021-05-05
Payer: MEDICAID

## 2021-05-05 PROCEDURE — 11900 INJECT SKIN LESIONS </W 7: CPT

## 2021-05-05 PROCEDURE — 99072 ADDL SUPL MATRL&STAF TM PHE: CPT

## 2021-05-05 PROCEDURE — 99214 OFFICE O/P EST MOD 30 MIN: CPT | Mod: 25

## 2021-05-14 ENCOUNTER — TRANSCRIPTION ENCOUNTER (OUTPATIENT)
Age: 61
End: 2021-05-14

## 2021-05-21 ENCOUNTER — TRANSCRIPTION ENCOUNTER (OUTPATIENT)
Age: 61
End: 2021-05-21

## 2021-05-24 ENCOUNTER — APPOINTMENT (OUTPATIENT)
Dept: INTERNAL MEDICINE | Facility: CLINIC | Age: 61
End: 2021-05-24
Payer: MEDICAID

## 2021-05-24 VITALS
HEART RATE: 82 BPM | TEMPERATURE: 98.24 F | OXYGEN SATURATION: 98 % | SYSTOLIC BLOOD PRESSURE: 106 MMHG | DIASTOLIC BLOOD PRESSURE: 72 MMHG

## 2021-05-24 DIAGNOSIS — Z02.0 ENCOUNTER FOR EXAMINATION FOR ADMISSION TO EDUCATIONAL INSTITUTION: ICD-10-CM

## 2021-05-24 DIAGNOSIS — Z11.1 ENCOUNTER FOR SCREENING FOR RESPIRATORY TUBERCULOSIS: ICD-10-CM

## 2021-05-24 PROCEDURE — 99213 OFFICE O/P EST LOW 20 MIN: CPT

## 2021-05-28 ENCOUNTER — RX RENEWAL (OUTPATIENT)
Age: 61
End: 2021-05-28

## 2021-05-28 ENCOUNTER — TRANSCRIPTION ENCOUNTER (OUTPATIENT)
Age: 61
End: 2021-05-28

## 2021-06-01 ENCOUNTER — NON-APPOINTMENT (OUTPATIENT)
Age: 61
End: 2021-06-01

## 2021-06-01 ENCOUNTER — TRANSCRIPTION ENCOUNTER (OUTPATIENT)
Age: 61
End: 2021-06-01

## 2021-06-01 LAB
ANION GAP SERPL CALC-SCNC: 19 MMOL/L
BUN SERPL-MCNC: 12 MG/DL
CALCIUM SERPL-MCNC: 9.3 MG/DL
CHLORIDE SERPL-SCNC: 106 MMOL/L
CO2 SERPL-SCNC: 18 MMOL/L
CREAT SERPL-MCNC: 0.78 MG/DL
GLUCOSE SERPL-MCNC: 72 MG/DL
POTASSIUM SERPL-SCNC: 4.2 MMOL/L
SODIUM SERPL-SCNC: 143 MMOL/L

## 2021-06-02 LAB
HBV SURFACE AB SERPL IA-ACNC: 272.9 MIU/ML
HEPATITIS A IGG ANTIBODY: NONREACTIVE
M TB IFN-G BLD-IMP: NEGATIVE
MEV IGG FLD QL IA: >300 AU/ML
MEV IGG+IGM SER-IMP: POSITIVE
MUV AB SER-ACNC: POSITIVE
MUV IGG SER QL IA: >300 AU/ML
POLIO 1 TITER BY  NEUTRALIZATION: NORMAL
POLIO 3 TITER BY  NEUTRALIZATION: NORMAL
QUANTIFERON TB PLUS MITOGEN MINUS NIL: 7.91 IU/ML
QUANTIFERON TB PLUS NIL: 0.02 IU/ML
QUANTIFERON TB PLUS TB1 MINUS NIL: 0 IU/ML
QUANTIFERON TB PLUS TB2 MINUS NIL: 0 IU/ML
RUBV IGG FLD-ACNC: 12.8 INDEX
RUBV IGG SER-IMP: POSITIVE
VZV AB TITR SER: POSITIVE
VZV IGG SER IF-ACNC: 1017 INDEX

## 2021-06-03 ENCOUNTER — TRANSCRIPTION ENCOUNTER (OUTPATIENT)
Age: 61
End: 2021-06-03

## 2021-06-11 ENCOUNTER — TRANSCRIPTION ENCOUNTER (OUTPATIENT)
Age: 61
End: 2021-06-11

## 2021-06-15 ENCOUNTER — RX RENEWAL (OUTPATIENT)
Age: 61
End: 2021-06-15

## 2021-06-15 ENCOUNTER — APPOINTMENT (OUTPATIENT)
Dept: DERMATOLOGY | Facility: CLINIC | Age: 61
End: 2021-06-15
Payer: MEDICAID

## 2021-06-15 ENCOUNTER — APPOINTMENT (OUTPATIENT)
Dept: INTERNAL MEDICINE | Facility: CLINIC | Age: 61
End: 2021-06-15
Payer: MEDICAID

## 2021-06-15 DIAGNOSIS — L63.9 ALOPECIA AREATA, UNSPECIFIED: ICD-10-CM

## 2021-06-15 DIAGNOSIS — Z23 ENCOUNTER FOR IMMUNIZATION: ICD-10-CM

## 2021-06-15 PROCEDURE — 90632 HEPA VACCINE ADULT IM: CPT

## 2021-06-15 PROCEDURE — 99214 OFFICE O/P EST MOD 30 MIN: CPT

## 2021-06-15 PROCEDURE — 90471 IMMUNIZATION ADMIN: CPT

## 2021-06-16 PROBLEM — L63.9 ALOPECIA AREATA: Status: ACTIVE | Noted: 2021-04-07

## 2021-06-21 ENCOUNTER — APPOINTMENT (OUTPATIENT)
Dept: ORTHOPEDIC SURGERY | Facility: CLINIC | Age: 61
End: 2021-06-21
Payer: MEDICAID

## 2021-06-21 PROCEDURE — 99214 OFFICE O/P EST MOD 30 MIN: CPT

## 2021-06-21 NOTE — HISTORY OF PRESENT ILLNESS
[de-identified] : 6/21/21: Got the left hip CSI on 4/15/21 and had good relief for about 6 weeks. Symptoms have since returned and are worse than ever. Taking only Tylenol for pain as was unable to continue with meloxicam given the alopecia treatment. Now only can go 2-3 blocks with cane. Using rigid AFOs and finds them tolerable. Would now like to schedule L CAITLIN ASAP. Still planning to go to [place], [place] in late August (will drive, using movers).\par \par 4/8/21: Reports that the left hip has been gradually worsening. Did go to [facility] for PT, but at the same time was wearing uncomfortable AFOs and found the therapy to be counterproductive. Afterwards began wearing different dynamic AFOs and continuing with her own HEP, which has been more effective. Went for the L hip CSI on 10/27/20 but cannot really recall whether it was helpful or not. Still using the meloxicam with slight relief. However, walking has become more and more difficult. Needs a cane to go any further than 5min at a time. Is not really going outside for recreation at all. She is interested in trying another CSI. She reports that she is planning to begin attending classes at [facility] in August. Still planning on keeping her [facility] apt and coming back and forth as needed.\par \par 10/20/20: 59y/o female following up for left hip osteoarthritis and bilateral dropfoot gait. Did not do PT or begin with Tylenol/NSAIDs; instead continued with own HEP. Has been experiencing worsening left hip pain, up to 10/10 within the last week. 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 Lumbar MRI did not demonstrate any significant neural element compression. Has not yet obtained AFOs; was confused as to whether they were necessary. Reports that she has been tripping. Generally tries not to walk around outside. Overall frustrated. She is inquiring about left hip corticosteroid injection.

## 2021-06-21 NOTE — PHYSICAL EXAM
[de-identified] : General appearance: well nourished and hydrated, alert and oriented x 3, cooperative. \par HEENT: normocephalic, EOM intact, wearing mask, external auditory canal clear. \par Cardiovascular: no lower leg edema, no varicosities, dorsalis pedis pulses palpable and symmetric. \par Lymphatics: no palpable lymphadenopathy, no lymphedema. \par Neurologic: sensation is normal, 4/5 weakness in ankle dorsiflexion otherwise 5/5 UE/LE muscle strength, patella tendon reflexes present and symmetric. \par Dermatologic: skin moist, warm, no rash. \par Spine: cervical spine with normal lordosis and painless range of motion, thoracic spine with normal kyphosis and painless range of motion, lumbosacral spine with normal lordosis and painless range of motion. No tenderness to palpation along midline spine and paraspinal musculature. Sacroiliac joints nontender bilaterally. Negative SLR and crossed SLR tests bilaterally.\par Gait: ambulating with cane, bilateral AFOs.\par \par Left hip:\par - No swelling\par - No specific tenderness on palpation\par - ROM: 120 flexion, 0 extension, 5 adduction, 30 abduction, 15 internal rotation, 15 external rotation\par - KENIA painful\par - FADIR painful\par - Dean negative\par - Stinchfield positive\par - Flexor power 5/5\par - Abductor power 5/5\par \par Right hip:\par - No swelling\par - No specific tenderness on palpation\par - ROM: 130 flexion, 0 extension, 20 adduction, 50 abduction, 15 internal rotation, 75 external rotation\par - KENIA painless\par - FADIR painless\par - Dean negative\par - Stinchfield negative\par - Flexor power 5/5\par - Abductor power 5/5

## 2021-06-21 NOTE — DISCUSSION/SUMMARY
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 We discussed the likelihood of satisfaction after complete recovery, and the potential causes of dissatisfaction. The importance of active patient participation in the rehabilitation protocol was emphasized, along with its influence on short and long-term outcomes. We discussed the risks, benefits, and alternatives of surgery at length. Specific risks of total hip replacement were discussed in detail. We discussed the risk of surgical site complications including but not limited to: surgical site infection, wound healing complications, bone fracture, tendon or ligament injury, neurovascular injury, hemorrhage, postoperative stiffness or instability, persistent pain, limb length discrepancy, and need for reoperation. We discussed surgical blood loss and the possible need for blood transfusion. We discussed the risk of perioperative medical complications, including but not limited to catheter-associated urinary tract infection, venous thromboembolism and other cardiopulmonary complications. We discussed anesthetic options and the risk of anesthesia-related complications. We discussed the potential benefits of surgery including the potential to improve the current clinical condition through operative intervention. I emphasized that there are alternatives to surgical intervention including continued conservative management, though such a course could yield less than optimal results in this particular patient. A model was used to demonstrate the operation and to discuss bearing surfaces of the implants. We discussed implant fixation methods; my plan would be to use fully cementless fixation in this case. We discussed the various surgical approaches to the hip; I think that an anterior approach would be appropriate in this case. We discussed the durability of prosthetic hips and limitations related to wear, osteolysis and loosening. All questions were answered to the patient's satisfaction. The patient was given a copy of my preoperative packet with additional information about the procedure. I asked the patient to either call back or schedule a followup appointment for any additional questions or concerns regarding the procedure.\par - Cont current AFOs\par - Cont Tylenol as needed for pain\par - Book for surgery at a convenient date. No earlier than 7/15/21 given the CSI on April 15th\par - Standard medical clearance, class preoperatively

## 2021-07-01 ENCOUNTER — NON-APPOINTMENT (OUTPATIENT)
Age: 61
End: 2021-07-01

## 2021-07-01 ENCOUNTER — OUTPATIENT (OUTPATIENT)
Dept: OUTPATIENT SERVICES | Facility: HOSPITAL | Age: 61
LOS: 1 days | End: 2021-07-01
Payer: MEDICAID

## 2021-07-06 ENCOUNTER — TRANSCRIPTION ENCOUNTER (OUTPATIENT)
Age: 61
End: 2021-07-06

## 2021-07-07 ENCOUNTER — RESULT REVIEW (OUTPATIENT)
Age: 61
End: 2021-07-07

## 2021-07-07 ENCOUNTER — NON-APPOINTMENT (OUTPATIENT)
Age: 61
End: 2021-07-07

## 2021-07-07 ENCOUNTER — APPOINTMENT (OUTPATIENT)
Dept: INTERNAL MEDICINE | Facility: CLINIC | Age: 61
End: 2021-07-07
Payer: MEDICAID

## 2021-07-07 ENCOUNTER — OUTPATIENT (OUTPATIENT)
Dept: OUTPATIENT SERVICES | Facility: HOSPITAL | Age: 61
LOS: 1 days | End: 2021-07-07
Payer: MEDICAID

## 2021-07-07 VITALS
WEIGHT: 112 LBS | SYSTOLIC BLOOD PRESSURE: 110 MMHG | OXYGEN SATURATION: 98 % | DIASTOLIC BLOOD PRESSURE: 76 MMHG | TEMPERATURE: 98.2 F | HEART RATE: 73 BPM | BODY MASS INDEX: 18.64 KG/M2

## 2021-07-07 DIAGNOSIS — Z01.811 ENCOUNTER FOR PREPROCEDURAL RESPIRATORY EXAMINATION: ICD-10-CM

## 2021-07-07 DIAGNOSIS — Z01.818 ENCOUNTER FOR OTHER PREPROCEDURAL EXAMINATION: ICD-10-CM

## 2021-07-07 DIAGNOSIS — M16.12 UNILATERAL PRIMARY OSTEOARTHRITIS, LEFT HIP: ICD-10-CM

## 2021-07-07 DIAGNOSIS — Z01.812 ENCOUNTER FOR PREPROCEDURAL LABORATORY EXAMINATION: ICD-10-CM

## 2021-07-07 PROCEDURE — 93000 ELECTROCARDIOGRAM COMPLETE: CPT

## 2021-07-07 PROCEDURE — 71046 X-RAY EXAM CHEST 2 VIEWS: CPT | Mod: 26

## 2021-07-07 PROCEDURE — 99214 OFFICE O/P EST MOD 30 MIN: CPT | Mod: 25

## 2021-07-13 ENCOUNTER — TRANSCRIPTION ENCOUNTER (OUTPATIENT)
Age: 61
End: 2021-07-13

## 2021-07-14 ENCOUNTER — TRANSCRIPTION ENCOUNTER (OUTPATIENT)
Age: 61
End: 2021-07-14

## 2021-07-16 ENCOUNTER — TRANSCRIPTION ENCOUNTER (OUTPATIENT)
Age: 61
End: 2021-07-16

## 2021-07-16 LAB
ALBUMIN SERPL ELPH-MCNC: 5.3 G/DL
ALP BLD-CCNC: 94 U/L
ALT SERPL-CCNC: 18 U/L
ANION GAP SERPL CALC-SCNC: 16 MMOL/L
APPEARANCE: CLEAR
APTT BLD: 30.4 SEC
AST SERPL-CCNC: 34 U/L
BACTERIA UR CULT: ABNORMAL
BACTERIA: ABNORMAL
BASOPHILS # BLD AUTO: 0.04 K/UL
BASOPHILS NFR BLD AUTO: 0.7 %
BILIRUB SERPL-MCNC: 0.2 MG/DL
BILIRUBIN URINE: NEGATIVE
BLOOD URINE: NEGATIVE
BUN SERPL-MCNC: 16 MG/DL
CALCIUM SERPL-MCNC: 10 MG/DL
CHLORIDE SERPL-SCNC: 99 MMOL/L
CO2 SERPL-SCNC: 23 MMOL/L
COLOR: NORMAL
CREAT SERPL-MCNC: 1.06 MG/DL
EOSINOPHIL # BLD AUTO: 0.49 K/UL
EOSINOPHIL NFR BLD AUTO: 8.2 %
ESTIMATED AVERAGE GLUCOSE: 108 MG/DL
GLUCOSE QUALITATIVE U: NEGATIVE
GLUCOSE SERPL-MCNC: 91 MG/DL
HBA1C MFR BLD HPLC: 5.4 %
HCT VFR BLD CALC: 42.5 %
HGB BLD-MCNC: 13.2 G/DL
HYALINE CASTS: 2 /LPF
IMM GRANULOCYTES NFR BLD AUTO: 0.3 %
INR PPP: 0.93 RATIO
KETONES URINE: NEGATIVE
LEUKOCYTE ESTERASE URINE: NEGATIVE
LYMPHOCYTES # BLD AUTO: 1.74 K/UL
LYMPHOCYTES NFR BLD AUTO: 29.1 %
MAN DIFF?: NORMAL
MCHC RBC-ENTMCNC: 29 PG
MCHC RBC-ENTMCNC: 31.1 GM/DL
MCV RBC AUTO: 93.4 FL
MICROSCOPIC-UA: NORMAL
MONOCYTES # BLD AUTO: 0.55 K/UL
MONOCYTES NFR BLD AUTO: 9.2 %
NEUTROPHILS # BLD AUTO: 3.13 K/UL
NEUTROPHILS NFR BLD AUTO: 52.5 %
NITRITE URINE: POSITIVE
PH URINE: 5.5
PLATELET # BLD AUTO: 225 K/UL
POTASSIUM SERPL-SCNC: 5.1 MMOL/L
PROT SERPL-MCNC: 7.8 G/DL
PROTEIN URINE: NEGATIVE
PT BLD: 11.1 SEC
RBC # BLD: 4.55 M/UL
RBC # FLD: 13.6 %
RED BLOOD CELLS URINE: 1 /HPF
SODIUM SERPL-SCNC: 137 MMOL/L
SPECIFIC GRAVITY URINE: 1.01
SQUAMOUS EPITHELIAL CELLS: 1 /HPF
UROBILINOGEN URINE: NORMAL
WBC # FLD AUTO: 5.97 K/UL
WHITE BLOOD CELLS URINE: 2 /HPF

## 2021-07-16 RX ORDER — BETAMETHASONE VALERATE 1 MG/G
0.1 CREAM TOPICAL
Qty: 30 | Refills: 0 | Status: COMPLETED | COMMUNITY
Start: 2020-07-27 | End: 2021-07-16

## 2021-07-16 RX ORDER — MELOXICAM 7.5 MG/1
7.5 TABLET ORAL DAILY
Qty: 30 | Refills: 2 | Status: COMPLETED | COMMUNITY
Start: 2020-10-20 | End: 2021-07-16

## 2021-07-16 RX ORDER — CLOTRIMAZOLE 10 MG/G
1 CREAM VAGINAL
Qty: 45 | Refills: 0 | Status: COMPLETED | COMMUNITY
Start: 2020-07-27 | End: 2021-07-16

## 2021-07-21 ENCOUNTER — TRANSCRIPTION ENCOUNTER (OUTPATIENT)
Age: 61
End: 2021-07-21

## 2021-07-21 VITALS
OXYGEN SATURATION: 97 % | HEIGHT: 65 IN | SYSTOLIC BLOOD PRESSURE: 105 MMHG | DIASTOLIC BLOOD PRESSURE: 69 MMHG | WEIGHT: 115.74 LBS | HEART RATE: 66 BPM | TEMPERATURE: 97 F | RESPIRATION RATE: 16 BRPM

## 2021-07-21 RX ORDER — CHLORHEXIDINE GLUCONATE 213 G/1000ML
1 SOLUTION TOPICAL ONCE
Refills: 0 | Status: DISCONTINUED | OUTPATIENT
Start: 2021-07-22 | End: 2021-07-25

## 2021-07-21 RX ORDER — POVIDONE-IODINE 5 %
1 AEROSOL (ML) TOPICAL ONCE
Refills: 0 | Status: COMPLETED | OUTPATIENT
Start: 2021-07-22 | End: 2021-07-22

## 2021-07-21 RX ORDER — CITALOPRAM 10 MG/1
1 TABLET, FILM COATED ORAL
Qty: 0 | Refills: 0 | DISCHARGE

## 2021-07-21 NOTE — H&P ADULT - HISTORY OF PRESENT ILLNESS
60 yo F with left hip pain x     Presents today for elective left total hip replacement. 60 yo F PMHX of muscular dystrophy with left hip pain x 1 year with radiation down LLE.  Pt states pain began slowly and progressed denying any precipitating event, and states that it is worse when ambulating. Pt takes tylenol and ibuprofen for her L hip pain without relief. Pt ambulates using a cane for assistance 2/2 hip pain and braces due ot her muscular dystrophy. Pt has attempted and failed conservative treatment for her left hip pain consisting of PT and corticosteroid injection x2.  Pt not on any anticoagulation meds and denies hx of DVT. Pt denies numbness and tingling down lower extremities b/l, fever, chills, recent illness, CP, SOB, N/V, or any other complaints.     Presents today for elective left total hip replacement.

## 2021-07-21 NOTE — H&P ADULT - NSICDXPASTMEDICALHX_GEN_ALL_CORE_FT
PAST MEDICAL HISTORY:  Anxiety     History of muscular dystrophy     OA (osteoarthritis) of hip

## 2021-07-21 NOTE — H&P ADULT - NSHPPHYSICALEXAM_GEN_ALL_CORE
MSK: decreased ROM of left hip secondary to pain    Rest of PE per medical clearance General: Alert and oriented, NAD  MSK: decreased ROM of left hip secondary to pain  b/l TA 0/5  EHL/FHL/Gastro 5/5 b/l   wwp, brisk cap refill   Gross sensation to light touch intact throughout lower extremities b/l     Rest of PE per medical clearance

## 2021-07-21 NOTE — H&P ADULT - PROBLEM SELECTOR PLAN 1
Admit to Orthopedic service  For elective left total hip replacement   Medically cleared for surgery by Dr. Lopez

## 2021-07-21 NOTE — H&P ADULT - ATTENDING COMMENTS
As above. Note that patient has chronic bilateral footdrop secondary to muscular dystrophy affecting the tibialis anterior bilaterally.

## 2021-07-21 NOTE — H&P ADULT - NSHPLABSRESULTS_GEN_ALL_CORE
COVID vaccine #2 on 04/24 Preop CBC, BMP, PT/INR, PTT, UA WNL  SCr 1.06  Preop CXR WNL per clearance   Preop EKG WNL per clearance  COVID vaccine #2 on 04/24/2021   DOS

## 2021-07-22 ENCOUNTER — APPOINTMENT (OUTPATIENT)
Dept: ORTHOPEDIC SURGERY | Facility: HOSPITAL | Age: 61
End: 2021-07-22

## 2021-07-22 ENCOUNTER — INPATIENT (INPATIENT)
Facility: HOSPITAL | Age: 61
LOS: 2 days | Discharge: HOME HEALTH | DRG: 470 | End: 2021-07-25
Attending: ORTHOPAEDIC SURGERY | Admitting: ORTHOPAEDIC SURGERY
Payer: COMMERCIAL

## 2021-07-22 DIAGNOSIS — Z86.69 PERSONAL HISTORY OF OTHER DISEASES OF THE NERVOUS SYSTEM AND SENSE ORGANS: Chronic | ICD-10-CM

## 2021-07-22 DIAGNOSIS — M16.12 UNILATERAL PRIMARY OSTEOARTHRITIS, LEFT HIP: ICD-10-CM

## 2021-07-22 PROCEDURE — 27130 TOTAL HIP ARTHROPLASTY: CPT | Mod: LT

## 2021-07-22 RX ORDER — SODIUM CHLORIDE 9 MG/ML
1000 INJECTION, SOLUTION INTRAVENOUS
Refills: 0 | Status: DISCONTINUED | OUTPATIENT
Start: 2021-07-22 | End: 2021-07-25

## 2021-07-22 RX ORDER — CELECOXIB 200 MG/1
400 CAPSULE ORAL ONCE
Refills: 0 | Status: COMPLETED | OUTPATIENT
Start: 2021-07-22 | End: 2021-07-22

## 2021-07-22 RX ORDER — CEFAZOLIN SODIUM 1 G
2000 VIAL (EA) INJECTION EVERY 8 HOURS
Refills: 0 | Status: DISCONTINUED | OUTPATIENT
Start: 2021-07-22 | End: 2021-07-22

## 2021-07-22 RX ORDER — GABAPENTIN 400 MG/1
600 CAPSULE ORAL ONCE
Refills: 0 | Status: COMPLETED | OUTPATIENT
Start: 2021-07-22 | End: 2021-07-22

## 2021-07-22 RX ORDER — OXYCODONE HYDROCHLORIDE 5 MG/1
5 TABLET ORAL EVERY 4 HOURS
Refills: 0 | Status: DISCONTINUED | OUTPATIENT
Start: 2021-07-22 | End: 2021-07-25

## 2021-07-22 RX ORDER — SPIRONOLACTONE 25 MG/1
50 TABLET, FILM COATED ORAL DAILY
Refills: 0 | Status: DISCONTINUED | OUTPATIENT
Start: 2021-07-22 | End: 2021-07-23

## 2021-07-22 RX ORDER — CITALOPRAM 10 MG/1
20 TABLET, FILM COATED ORAL DAILY
Refills: 0 | Status: DISCONTINUED | OUTPATIENT
Start: 2021-07-22 | End: 2021-07-25

## 2021-07-22 RX ORDER — ASPIRIN/CALCIUM CARB/MAGNESIUM 324 MG
81 TABLET ORAL
Refills: 0 | Status: DISCONTINUED | OUTPATIENT
Start: 2021-07-23 | End: 2021-07-25

## 2021-07-22 RX ORDER — ACETAMINOPHEN 500 MG
975 TABLET ORAL EVERY 8 HOURS
Refills: 0 | Status: DISCONTINUED | OUTPATIENT
Start: 2021-07-22 | End: 2021-07-25

## 2021-07-22 RX ORDER — OXYCODONE HYDROCHLORIDE 5 MG/1
10 TABLET ORAL EVERY 4 HOURS
Refills: 0 | Status: DISCONTINUED | OUTPATIENT
Start: 2021-07-22 | End: 2021-07-25

## 2021-07-22 RX ORDER — CITALOPRAM 10 MG/1
1 TABLET, FILM COATED ORAL
Qty: 0 | Refills: 0 | DISCHARGE

## 2021-07-22 RX ORDER — HYDROMORPHONE HYDROCHLORIDE 2 MG/ML
0.5 INJECTION INTRAMUSCULAR; INTRAVENOUS; SUBCUTANEOUS
Refills: 0 | Status: DISCONTINUED | OUTPATIENT
Start: 2021-07-22 | End: 2021-07-25

## 2021-07-22 RX ORDER — POLYETHYLENE GLYCOL 3350 17 G/17G
17 POWDER, FOR SOLUTION ORAL AT BEDTIME
Refills: 0 | Status: DISCONTINUED | OUTPATIENT
Start: 2021-07-22 | End: 2021-07-25

## 2021-07-22 RX ORDER — SENNA PLUS 8.6 MG/1
2 TABLET ORAL AT BEDTIME
Refills: 0 | Status: DISCONTINUED | OUTPATIENT
Start: 2021-07-22 | End: 2021-07-25

## 2021-07-22 RX ORDER — BUPIVACAINE 13.3 MG/ML
20 INJECTION, SUSPENSION, LIPOSOMAL INFILTRATION ONCE
Refills: 0 | Status: DISCONTINUED | OUTPATIENT
Start: 2021-07-22 | End: 2021-07-25

## 2021-07-22 RX ORDER — CEFAZOLIN SODIUM 1 G
2000 VIAL (EA) INJECTION EVERY 8 HOURS
Refills: 0 | Status: COMPLETED | OUTPATIENT
Start: 2021-07-22 | End: 2021-07-23

## 2021-07-22 RX ORDER — ACETAMINOPHEN 500 MG
1000 TABLET ORAL ONCE
Refills: 0 | Status: COMPLETED | OUTPATIENT
Start: 2021-07-22 | End: 2021-07-22

## 2021-07-22 RX ORDER — ONDANSETRON 8 MG/1
4 TABLET, FILM COATED ORAL EVERY 6 HOURS
Refills: 0 | Status: DISCONTINUED | OUTPATIENT
Start: 2021-07-22 | End: 2021-07-25

## 2021-07-22 RX ORDER — SPIRONOLACTONE 25 MG/1
0 TABLET, FILM COATED ORAL
Qty: 0 | Refills: 0 | DISCHARGE

## 2021-07-22 RX ORDER — HYDROMORPHONE HYDROCHLORIDE 2 MG/ML
0.5 INJECTION INTRAMUSCULAR; INTRAVENOUS; SUBCUTANEOUS EVERY 4 HOURS
Refills: 0 | Status: DISCONTINUED | OUTPATIENT
Start: 2021-07-22 | End: 2021-07-25

## 2021-07-22 RX ORDER — APREPITANT 80 MG/1
40 CAPSULE ORAL ONCE
Refills: 0 | Status: COMPLETED | OUTPATIENT
Start: 2021-07-22 | End: 2021-07-22

## 2021-07-22 RX ADMIN — CELECOXIB 400 MILLIGRAM(S): 200 CAPSULE ORAL at 06:26

## 2021-07-22 RX ADMIN — Medication 975 MILLIGRAM(S): at 14:44

## 2021-07-22 RX ADMIN — HYDROMORPHONE HYDROCHLORIDE 0.5 MILLIGRAM(S): 2 INJECTION INTRAMUSCULAR; INTRAVENOUS; SUBCUTANEOUS at 11:13

## 2021-07-22 RX ADMIN — HYDROMORPHONE HYDROCHLORIDE 0.5 MILLIGRAM(S): 2 INJECTION INTRAMUSCULAR; INTRAVENOUS; SUBCUTANEOUS at 11:27

## 2021-07-22 RX ADMIN — Medication 2000 MILLIGRAM(S): at 16:31

## 2021-07-22 RX ADMIN — GABAPENTIN 600 MILLIGRAM(S): 400 CAPSULE ORAL at 06:27

## 2021-07-22 RX ADMIN — Medication 975 MILLIGRAM(S): at 15:44

## 2021-07-22 RX ADMIN — CITALOPRAM 20 MILLIGRAM(S): 10 TABLET, FILM COATED ORAL at 14:44

## 2021-07-22 RX ADMIN — OXYCODONE HYDROCHLORIDE 10 MILLIGRAM(S): 5 TABLET ORAL at 21:08

## 2021-07-22 RX ADMIN — OXYCODONE HYDROCHLORIDE 10 MILLIGRAM(S): 5 TABLET ORAL at 22:08

## 2021-07-22 RX ADMIN — POLYETHYLENE GLYCOL 3350 17 GRAM(S): 17 POWDER, FOR SOLUTION ORAL at 21:07

## 2021-07-22 RX ADMIN — OXYCODONE HYDROCHLORIDE 10 MILLIGRAM(S): 5 TABLET ORAL at 16:31

## 2021-07-22 RX ADMIN — Medication 975 MILLIGRAM(S): at 21:07

## 2021-07-22 RX ADMIN — SENNA PLUS 2 TABLET(S): 8.6 TABLET ORAL at 21:07

## 2021-07-22 RX ADMIN — Medication 975 MILLIGRAM(S): at 22:08

## 2021-07-22 RX ADMIN — Medication 1 APPLICATION(S): at 06:27

## 2021-07-22 RX ADMIN — OXYCODONE HYDROCHLORIDE 10 MILLIGRAM(S): 5 TABLET ORAL at 17:31

## 2021-07-22 RX ADMIN — Medication 1000 MILLIGRAM(S): at 06:26

## 2021-07-22 RX ADMIN — APREPITANT 40 MILLIGRAM(S): 80 CAPSULE ORAL at 06:27

## 2021-07-22 NOTE — PHYSICAL THERAPY INITIAL EVALUATION ADULT - ADDITIONAL COMMENTS
Pt lives alone in elevator access apartment. Pt reports to be using SC and Ankle braces to ambulate at baseline.

## 2021-07-22 NOTE — BRIEF OPERATIVE NOTE - NSICDXBRIEFPREOP_GEN_ALL_CORE_FT
PRE-OP DIAGNOSIS:  Muscular dystrophy 22-Jul-2021 10:59:49  Juwan Willson  Primary osteoarthritis of left hip 22-Jul-2021 11:00:11  Juwan Willson

## 2021-07-22 NOTE — PROGRESS NOTE ADULT - SUBJECTIVE AND OBJECTIVE BOX
Ortho Post Op Check    Procedure: Left total hip replacement   Surgeon: Dr. Sahu     Patient seen and examined at bedside   Pt comfortable without complaints, pain controlled left hip   Denies CP, SOB, N/V, numbness/tingling     Vital Signs Last 24 Hrs  T(C): 36.5 (07-22-21 @ 12:47), Max: 36.5 (07-22-21 @ 12:47)  T(F): 97.7 (07-22-21 @ 12:47), Max: 97.7 (07-22-21 @ 12:47)  HR: 68 (07-22-21 @ 12:47) (54 - 68)  BP: 114/75 (07-22-21 @ 12:47) (95/54 - 120/63)  BP(mean): 78 (07-22-21 @ 11:51) (68 - 84)  RR: 14 (07-22-21 @ 12:47) (11 - 16)  SpO2: 100% (07-22-21 @ 12:47) (94% - 100%)  AVSS    General: Pt Alert and oriented, NAD  Dressing C/D/I: aquacel at the left hip   Pulses: 2+ DP pulses bilateral LE   Sensation: intact to light touch bilateral LE   Motor: EHL/FHL/GS 5/5 bilaterally, TA 0/5 baseline consistent with preop exam 2/2 muscular dystrophy         Post-op X-Ray: intraop left hip XR reveals total hip prosthesis in anatomic alignment post op     A/P: 61yFemale POD#0 s/p Left anterior CAITLIN     - Stable post op   - c/w Pain Control   - DVT ppx: ASA 81mg BID   - Post op abx: ancef x2 post op   - PT, WBS: WBAT, PT    Ortho Pager [phone]

## 2021-07-22 NOTE — PHYSICAL THERAPY INITIAL EVALUATION ADULT - PLANNED THERAPY INTERVENTIONS, PT EVAL
balance training/bed mobility training/gait training/joint mobilization/strengthening/transfer training

## 2021-07-22 NOTE — PHYSICAL THERAPY INITIAL EVALUATION ADULT - PERTINENT HX OF CURRENT PROBLEM, REHAB EVAL
60 yo F PMHX of muscular dystrophy with left hip pain x 1 year with radiation down LLE.  Pt states pain began slowly and progressed denying any precipitating event, and states that it is worse when ambulating. Pt takes tylenol and ibuprofen for her L hip pain without relief. Pt ambulates using a cane for assistance 2/2 hip pain and braces due ot her muscular dystrophy.

## 2021-07-23 ENCOUNTER — TRANSCRIPTION ENCOUNTER (OUTPATIENT)
Age: 61
End: 2021-07-23

## 2021-07-23 LAB
ANION GAP SERPL CALC-SCNC: 6 MMOL/L — SIGNIFICANT CHANGE UP (ref 5–17)
BUN SERPL-MCNC: 16 MG/DL — SIGNIFICANT CHANGE UP (ref 7–23)
CALCIUM SERPL-MCNC: 9.1 MG/DL — SIGNIFICANT CHANGE UP (ref 8.4–10.5)
CHLORIDE SERPL-SCNC: 106 MMOL/L — SIGNIFICANT CHANGE UP (ref 96–108)
CO2 SERPL-SCNC: 27 MMOL/L — SIGNIFICANT CHANGE UP (ref 22–31)
COVID-19 SPIKE DOMAIN AB INTERP: POSITIVE
COVID-19 SPIKE DOMAIN ANTIBODY RESULT: >250 U/ML — HIGH
CREAT SERPL-MCNC: 0.8 MG/DL — SIGNIFICANT CHANGE UP (ref 0.5–1.3)
FERRITIN SERPL-MCNC: 86 NG/ML — SIGNIFICANT CHANGE UP (ref 15–150)
GLUCOSE SERPL-MCNC: 124 MG/DL — HIGH (ref 70–99)
HCT VFR BLD CALC: 27.7 % — LOW (ref 34.5–45)
HCV AB S/CO SERPL IA: 0.04 S/CO — SIGNIFICANT CHANGE UP
HCV AB SERPL-IMP: SIGNIFICANT CHANGE UP
HGB BLD-MCNC: 8.8 G/DL — LOW (ref 11.5–15.5)
IRON SATN MFR SERPL: 11 % — LOW (ref 14–50)
IRON SATN MFR SERPL: 24 UG/DL — LOW (ref 30–160)
MCHC RBC-ENTMCNC: 28.9 PG — SIGNIFICANT CHANGE UP (ref 27–34)
MCHC RBC-ENTMCNC: 31.8 GM/DL — LOW (ref 32–36)
MCV RBC AUTO: 91.1 FL — SIGNIFICANT CHANGE UP (ref 80–100)
NRBC # BLD: 0 /100 WBCS — SIGNIFICANT CHANGE UP (ref 0–0)
PLATELET # BLD AUTO: 147 K/UL — LOW (ref 150–400)
POTASSIUM SERPL-MCNC: 4.6 MMOL/L — SIGNIFICANT CHANGE UP (ref 3.5–5.3)
POTASSIUM SERPL-SCNC: 4.6 MMOL/L — SIGNIFICANT CHANGE UP (ref 3.5–5.3)
RBC # BLD: 3.04 M/UL — LOW (ref 3.8–5.2)
RBC # FLD: 13.1 % — SIGNIFICANT CHANGE UP (ref 10.3–14.5)
RETICS #: 38.5 K/UL — SIGNIFICANT CHANGE UP (ref 25–125)
RETICS/RBC NFR: 1.3 % — SIGNIFICANT CHANGE UP (ref 0.5–2.5)
SARS-COV-2 IGG+IGM SERPL QL IA: >250 U/ML — HIGH
SARS-COV-2 IGG+IGM SERPL QL IA: POSITIVE
SODIUM SERPL-SCNC: 139 MMOL/L — SIGNIFICANT CHANGE UP (ref 135–145)
TIBC SERPL-MCNC: 215 UG/DL — LOW (ref 220–430)
TRANSFERRIN SERPL-MCNC: 187 MG/DL — LOW (ref 200–360)
UIBC SERPL-MCNC: 191 UG/DL — SIGNIFICANT CHANGE UP (ref 110–370)
WBC # BLD: 10.8 K/UL — HIGH (ref 3.8–10.5)
WBC # FLD AUTO: 10.8 K/UL — HIGH (ref 3.8–10.5)

## 2021-07-23 PROCEDURE — 99223 1ST HOSP IP/OBS HIGH 75: CPT | Mod: GC

## 2021-07-23 RX ORDER — MAGNESIUM HYDROXIDE 400 MG/1
30 TABLET, CHEWABLE ORAL DAILY
Refills: 0 | Status: DISCONTINUED | OUTPATIENT
Start: 2021-07-23 | End: 2021-07-25

## 2021-07-23 RX ORDER — SPIRONOLACTONE 25 MG/1
50 TABLET, FILM COATED ORAL ONCE
Refills: 0 | Status: COMPLETED | OUTPATIENT
Start: 2021-07-23 | End: 2021-07-23

## 2021-07-23 RX ORDER — ASPIRIN/CALCIUM CARB/MAGNESIUM 324 MG
1 TABLET ORAL
Qty: 0 | Refills: 0 | DISCHARGE
Start: 2021-07-23

## 2021-07-23 RX ORDER — PANTOPRAZOLE SODIUM 20 MG/1
1 TABLET, DELAYED RELEASE ORAL
Qty: 0 | Refills: 0 | DISCHARGE
Start: 2021-07-23

## 2021-07-23 RX ORDER — OXYCODONE HYDROCHLORIDE 5 MG/1
1 TABLET ORAL
Qty: 0 | Refills: 0 | DISCHARGE
Start: 2021-07-23

## 2021-07-23 RX ORDER — ACETAMINOPHEN 500 MG
2 TABLET ORAL
Qty: 0 | Refills: 0 | DISCHARGE

## 2021-07-23 RX ORDER — SPIRONOLACTONE 25 MG/1
100 TABLET, FILM COATED ORAL DAILY
Refills: 0 | Status: DISCONTINUED | OUTPATIENT
Start: 2021-07-24 | End: 2021-07-25

## 2021-07-23 RX ORDER — PANTOPRAZOLE SODIUM 20 MG/1
40 TABLET, DELAYED RELEASE ORAL
Refills: 0 | Status: DISCONTINUED | OUTPATIENT
Start: 2021-07-23 | End: 2021-07-25

## 2021-07-23 RX ORDER — FERROUS SULFATE 325(65) MG
325 TABLET ORAL DAILY
Refills: 0 | Status: DISCONTINUED | OUTPATIENT
Start: 2021-07-23 | End: 2021-07-25

## 2021-07-23 RX ADMIN — OXYCODONE HYDROCHLORIDE 10 MILLIGRAM(S): 5 TABLET ORAL at 17:51

## 2021-07-23 RX ADMIN — OXYCODONE HYDROCHLORIDE 10 MILLIGRAM(S): 5 TABLET ORAL at 10:45

## 2021-07-23 RX ADMIN — Medication 975 MILLIGRAM(S): at 15:00

## 2021-07-23 RX ADMIN — Medication 2000 MILLIGRAM(S): at 00:00

## 2021-07-23 RX ADMIN — Medication 81 MILLIGRAM(S): at 05:11

## 2021-07-23 RX ADMIN — SPIRONOLACTONE 50 MILLIGRAM(S): 25 TABLET, FILM COATED ORAL at 14:00

## 2021-07-23 RX ADMIN — Medication 975 MILLIGRAM(S): at 14:00

## 2021-07-23 RX ADMIN — Medication 325 MILLIGRAM(S): at 17:51

## 2021-07-23 RX ADMIN — CITALOPRAM 20 MILLIGRAM(S): 10 TABLET, FILM COATED ORAL at 12:22

## 2021-07-23 RX ADMIN — Medication 975 MILLIGRAM(S): at 21:07

## 2021-07-23 RX ADMIN — SPIRONOLACTONE 50 MILLIGRAM(S): 25 TABLET, FILM COATED ORAL at 12:22

## 2021-07-23 RX ADMIN — SENNA PLUS 2 TABLET(S): 8.6 TABLET ORAL at 21:12

## 2021-07-23 RX ADMIN — POLYETHYLENE GLYCOL 3350 17 GRAM(S): 17 POWDER, FOR SOLUTION ORAL at 21:12

## 2021-07-23 RX ADMIN — OXYCODONE HYDROCHLORIDE 10 MILLIGRAM(S): 5 TABLET ORAL at 03:25

## 2021-07-23 RX ADMIN — Medication 975 MILLIGRAM(S): at 06:11

## 2021-07-23 RX ADMIN — Medication 81 MILLIGRAM(S): at 17:51

## 2021-07-23 RX ADMIN — OXYCODONE HYDROCHLORIDE 10 MILLIGRAM(S): 5 TABLET ORAL at 04:25

## 2021-07-23 RX ADMIN — Medication 975 MILLIGRAM(S): at 05:11

## 2021-07-23 RX ADMIN — Medication 975 MILLIGRAM(S): at 22:07

## 2021-07-23 RX ADMIN — OXYCODONE HYDROCHLORIDE 10 MILLIGRAM(S): 5 TABLET ORAL at 09:45

## 2021-07-23 NOTE — PROGRESS NOTE ADULT - SUBJECTIVE AND OBJECTIVE BOX
Ortho Note    Pt comfortable without complaints, pain controlled  Denies CP, SOB, N/V, numbness/tingling     Vital Signs Last 24 Hrs  T(C): 37.3 (07-23-21 @ 08:38), Max: 37.3 (07-23-21 @ 08:38)  T(F): 99.1 (07-23-21 @ 08:38), Max: 99.1 (07-23-21 @ 08:38)  HR: 71 (07-23-21 @ 08:38) (66 - 71)  BP: 106/66 (07-23-21 @ 08:38) (105/61 - 106/66)  BP(mean): --  RR: 16 (07-23-21 @ 08:38) (15 - 16)  SpO2: 99% (07-23-21 @ 08:38) (98% - 99%)  AVSS    General: Pt Alert and oriented, NAD  DSG C/D/I  Pulses: +2DP, WWP feet  Sensation: SILT BLE  Motor: 5/5 EHL/FHL/GS BLE; 1/5 TA BLE (more mobile today than baseline as per pt, h/o MD)                          8.8    10.80 )-----------( 147      ( 23 Jul 2021 07:21 )             27.7     07-23    139  |  106  |  16  ----------------------------<  124<H>  4.6   |  27  |  0.80    Ca    9.1      23 Jul 2021 07:21        A/P: 61yFemale POD#1 s/p left total hip replacement  - VSS, continue to monitor H+H  - Pain Control  - DVT ppx: ASA  - PT, WBS: WBAT  - avoiding NSAIDs (on spironolactone, risk of hyperkalemia)  - OOB for meals, I/S  - continue bowel regimen  - dispo: home with [facility]    Ortho Pager [phone]

## 2021-07-23 NOTE — OCCUPATIONAL THERAPY INITIAL EVALUATION ADULT - LIVES WITH, PROFILE
Pt reports living alone with 49 steps total. At baseline Pt reports ambulating with a cane and being independent with all ADL's/alone

## 2021-07-23 NOTE — DISCHARGE NOTE PROVIDER - HOSPITAL COURSE
Admitted 7/22/21  Surgery- left total hip replacement 7/22/21  Sharon-op Antibiotics  Pain control  DVT prophylaxis  OOB/Physical Therapy   Admitted 7/22/21  Surgery- left total hip replacement 7/22/21  Attending: Dr Sahu   Sharon-op Antibiotics  Pain control  DVT prophylaxis  OOB/Physical Therapy  Medicine consult

## 2021-07-23 NOTE — DISCHARGE NOTE PROVIDER - CARE PROVIDER_API CALL
Tavo Sahu)  Orthopedics  [address] [facility], [place] [address]  Phone: [phone]  Fax: [phone]  Follow Up Time: 2 weeks

## 2021-07-23 NOTE — CONSULT NOTE ADULT - SUBJECTIVE AND OBJECTIVE BOX
Per HPI "60 yo F PMHX of muscular dystrophy with left hip pain x 1 year with radiation down LLE.  Pt states pain began slowly and progressed denying any precipitating event, and states that it is worse when ambulating. Pt takes tylenol and ibuprofen for her L hip pain without relief. Pt ambulates using a cane for assistance 2/2 hip pain and braces due ot her muscular dystrophy. Pt has attempted and failed conservative treatment for her left hip pain consisting of PT and corticosteroid injection x2.  Pt not on any anticoagulation meds and denies hx of DVT. Pt denies numbness and tingling down lower extremities b/l, fever, chills, recent illness, CP, SOB, N/V, or any other complaints.     Presents today for elective left total hip replacement. (21 Jul 2021 09:22)"    61F w h/o anxiety, aplopecia, muscular dystrophy affecting b/l calves, L hip OA here for elective L CAITLIN 7/22 w Dr. Sahu    When being evaluated for L hip OA, pt was noted to have b/l foot drop. Further evaluation by Dr. Fierro revealed muscular dystrophy. Pt walks using a cane for assistance as she can trip at times. Lives alone in [facility] with 49 steps. Today - she states that the pain in her L hip is controlled. Denies any numbness. States she feels foot drop b/l has slightly improved. She denies any fever, chest pain, dyspnea, nausea, abd pain, dysuria. +Flatus wo BM. Voiding wo issues. No lightheadedness or dizziness. Denies any recent blood loss, hematuria, melena.    Medications: Citalopram 20mg daily; Aldactone 100mg daily. Stopped estrogen cream 1 week ago  FH: Non-contributory  SH: Non-smoker    PAST MEDICAL & SURGICAL HISTORY:  OA (osteoarthritis) of hip    Anxiety    History of muscular dystrophy      Home Meds: Home Medications:  acetaminophen 500 mg oral tablet: 2 tab(s) orally every 8 hours for pain control (23 Jul 2021 11:40)  aspirin 81 mg oral tablet, chewable: 1 tab(s) orally 2 times a day for 30 days after surgery (23 Jul 2021 11:40)  citalopram 20 mg oral tablet: 1 tab(s) orally once a day (22 Jul 2021 06:17)  EstroGel Pump 0.75 mg/1.25 g (0.06%) transdermal gel: once 3days ago (22 Jul 2021 06:17)  oxyCODONE 5 mg oral tablet: 1-2 tab(s) orally every 4-6 hours, As needed, Moderate to Severe pain (23 Jul 2021 11:40)  pantoprazole 40 mg oral delayed release tablet: 1 tab(s) orally once a day (before a meal) (23 Jul 2021 12:17)  spironolactone 50 mg oral tablet: orally once a day, 2Tablets (22 Jul 2021 06:17)    Allergies: Allergies    Carrots (Unknown)  No Known Drug Allergies    Intolerances      Soc:   Advanced Directives: Presumed Full Code     CURRENT MEDICATIONS:   --------------------------------------------------------------------------------------  Neurologic Medications  acetaminophen   Tablet .. 975 milliGRAM(s) Oral every 8 hours  citalopram 20 milliGRAM(s) Oral daily  HYDROmorphone  Injectable 0.5 milliGRAM(s) IV Push every 15 minutes PRN breakthrough pain  HYDROmorphone  Injectable 0.5 milliGRAM(s) IV Push every 4 hours PRN breakthrough pain  ondansetron Injectable 4 milliGRAM(s) IV Push every 6 hours PRN Nausea and/or Vomiting  oxyCODONE    IR 5 milliGRAM(s) Oral every 4 hours PRN Moderate Pain (4 - 6)  oxyCODONE    IR 10 milliGRAM(s) Oral every 4 hours PRN Severe Pain (7 - 10)    Respiratory Medications    Cardiovascular Medications  spironolactone 50 milliGRAM(s) Oral daily    Gastrointestinal Medications  lactated ringers. 1000 milliLiter(s) IV Continuous <Continuous>  magnesium hydroxide Suspension 30 milliLiter(s) Oral daily PRN Constipation  pantoprazole    Tablet 40 milliGRAM(s) Oral before breakfast  polyethylene glycol 3350 17 Gram(s) Oral at bedtime  senna 2 Tablet(s) Oral at bedtime    Genitourinary Medications    Hematologic/Oncologic Medications  aspirin  chewable 81 milliGRAM(s) Oral two times a day    Antimicrobial/Immunologic Medications    Endocrine/Metabolic Medications    Topical/Other Medications  BUpivacaine liposome 1.3% Injectable (no eMAR) 20 milliLiter(s) Local Injection once  chlorhexidine 2% Cloths 1 Application(s) Topical once    --------------------------------------------------------------------------------------    VITAL SIGNS, INS/OUTS (last 24 hours):  --------------------------------------------------------------------------------------  ICU Vital Signs Last 24 Hrs  T(C): 37.3 (23 Jul 2021 08:38), Max: 37.3 (23 Jul 2021 08:38)  T(F): 99.1 (23 Jul 2021 08:38), Max: 99.1 (23 Jul 2021 08:38)  HR: 71 (23 Jul 2021 08:38) (62 - 75)  BP: 106/66 (23 Jul 2021 08:38) (94/60 - 110/69)  BP(mean): --  ABP: --  ABP(mean): --  RR: 16 (23 Jul 2021 08:38) (15 - 17)  SpO2: 99% (23 Jul 2021 08:38) (98% - 100%)    I&O's Summary    22 Jul 2021 07:01  -  23 Jul 2021 07:00  --------------------------------------------------------  IN: 1580 mL / OUT: 1450 mL / NET: 130 mL    23 Jul 2021 07:01 - 23 Jul 2021 13:21  --------------------------------------------------------  IN: 420 mL / OUT: 1060 mL / NET: -640 mL      --------------------------------------------------------------------------------------    EXAM:  GEN: female in NAD on RA  HEENT: NC/AT, MMM  CV: RRR, nml S1S2, no murmurs  PULM: nml effort, CTAB  ABD: Soft, non-distended, NABS, non-tender  NEURO  A/O x3, moving all extremities,   Motor: decreased strength in L hip 2/2 pain.   4+/5 in b/l dorsiflexion  SKIN: Lateral thigh dressing c/d/i. Soft soft tissue swelling present wo erythema  PSYCH: Appropriate    LABS  --------------------------------------------------------------------------------------  Labs:  CAPILLARY BLOOD GLUCOSE                              8.8    10.80 )-----------( 147      ( 23 Jul 2021 07:21 )             27.7         07-23    139  |  106  |  16  ----------------------------<  124<H>  4.6   |  27  |  0.80      Calcium, Total Serum: 9.1 mg/dL (07-23-21 @ 07:21)      LFTs:         Coags:                  --------------------------------------------------------------------------------------    OTHER LABS    IMAGING RESULTS  ****************

## 2021-07-23 NOTE — CONSULT NOTE ADULT - ASSESSMENT
61F w h/o anxiety, aplopecia, muscular dystrophy affecting b/l calves, L hip OA here for elective L CAITLIN 7/22 w Dr. Sahu    #Post-op state - pain is controlled. PPx: ASA 81 BID per orthopedics. On bowel regimen.   #Blood loss anemia - hgb 8.4 today. Baseline 12.7. Pt currently asymptomatic. Does not appear to have active loss or hematoma  #Thrombocytopenia - 147 today. baseline is 225. Not receiving heparin products or having active bleed. continue to monitor  #Left Hip OA - s/p L CAITLIN - mgmt per orthopedics  #Anxiety - chronic. stable. c/w home citalopram 20  #Muscular dystrophy - chronic. stable. Follows w Dr. Fierro  #Alopecia - c/w aldactone 100mg  #GERD - c/w protonix    Recommendations  -Add on iron studies, ferritin, reticulocyte  -f/u CBC w diff  -Recommend f/u CBC in 4 weeks as outpatient (follows w Dr. Laney Geiger [facility] PMD)    DISPO: HPT - pt lives in walkup--also states she has a friend w a ground-floor garden apt she could stay with for several nights to get stronger.

## 2021-07-23 NOTE — DISCHARGE NOTE PROVIDER - NSDCFUADDINST_GEN_ALL_CORE_FT
**Please see Dr. Sahu's separate discharge instructions sheet. Your medications were sent to [facility], located on the first floor of [facility]. Take medications as prescribed by Dr. Sahu.**          Weight bear as tolerated with assistive device.  No strenuous activity, heavy lifting, driving or returning to work until cleared by MD.  You may shower - dressing is water-resistant, no soaking in bathtubs.  Remove dressing after post op day 5-7, then leave incision open to air. Keep incision clean and dry.  Try to have regular bowel movements, take stool softener or laxative if necessary.    Swelling may travel all the way down leg to foot, this is normal and will subside in a few weeks.  Call to schedule an appt with Dr. Sahu for follow up, if you have staples or sutures they will be removed in office.  Contact your doctor if you experience: fever greater than 101.5, chills, chest pain, difficulty breathing, redness or excessive drainage around the incision, other concerns.  Follow up with your primary care provider.   **Please see Dr. Sahu's separate discharge instructions sheet. Your medications were sent to [facility], located on the first floor of [facility]. Take medications as prescribed by Dr. Sahu.**  Recommend outpatient CBC in 4 weeks from surgery to follow up blood count     Weight bear as tolerated with assistive device.  No strenuous activity, heavy lifting, driving or returning to work until cleared by MD.  You may shower - dressing is water-resistant, no soaking in bathtubs.  Remove dressing after post op day 5-7, then leave incision open to air. Keep incision clean and dry.  Try to have regular bowel movements, take stool softener or laxative if necessary.    Swelling may travel all the way down leg to foot, this is normal and will subside in a few weeks.  Call to schedule an appt with Dr. Sahu for follow up, if you have staples or sutures they will be removed in office.  Contact your doctor if you experience: fever greater than 101.5, chills, chest pain, difficulty breathing, redness or excessive drainage around the incision, other concerns.  Follow up with your primary care provider.

## 2021-07-23 NOTE — DISCHARGE NOTE PROVIDER - NSDCMRMEDTOKEN_GEN_ALL_CORE_FT
acetaminophen 500 mg oral tablet: 2 tab(s) orally every 8 hours for pain control  aspirin 81 mg oral tablet, chewable: 1 tab(s) orally 2 times a day for 30 days after surgery  citalopram 20 mg oral tablet: 1 tab(s) orally once a day  EstroGel Pump 0.75 mg/1.25 g (0.06%) transdermal gel: once 3days ago  oxyCODONE 5 mg oral tablet: 1-2 tab(s) orally every 4-6 hours, As needed, Moderate to Severe pain  spironolactone 50 mg oral tablet: orally once a day, 2Tablets   acetaminophen 500 mg oral tablet: 2 tab(s) orally every 8 hours for pain control  aspirin 81 mg oral tablet, chewable: 1 tab(s) orally 2 times a day for 30 days after surgery  citalopram 20 mg oral tablet: 1 tab(s) orally once a day  EstroGel Pump 0.75 mg/1.25 g (0.06%) transdermal gel: once 3days ago  oxyCODONE 5 mg oral tablet: 1-2 tab(s) orally every 4-6 hours, As needed, Moderate to Severe pain  pantoprazole 40 mg oral delayed release tablet: 1 tab(s) orally once a day (before a meal)  spironolactone 50 mg oral tablet: orally once a day, 2Tablets   acetaminophen 500 mg oral tablet: 2 tab(s) orally every 8 hours for pain control  aspirin 81 mg oral tablet, chewable: 1 tab(s) orally 2 times a day for 30 days after surgery  citalopram 20 mg oral tablet: 1 tab(s) orally once a day  EstroGel Pump 0.75 mg/1.25 g (0.06%) transdermal gel: once 3days ago  ferrous sulfate 325 mg (65 mg elemental iron) oral tablet: 1 tab(s) orally once a day  oxyCODONE 5 mg oral tablet: 1-2 tab(s) orally every 4-6 hours, As needed, Moderate to Severe pain  pantoprazole 40 mg oral delayed release tablet: 1 tab(s) orally once a day (before a meal)  polyethylene glycol 3350 oral powder for reconstitution: 17 gram(s) orally once a day (at bedtime)  spironolactone 50 mg oral tablet: orally once a day, 2Tablets

## 2021-07-23 NOTE — OCCUPATIONAL THERAPY INITIAL EVALUATION ADULT - PERTINENT HX OF CURRENT PROBLEM, REHAB EVAL
62 yo F PMHX of muscular dystrophy with left hip pain x 1 year with radiation down LLE.  Pt states pain began slowly and progressed denying any precipitating event, and states that it is worse when ambulating.  Pt ambulates using a cane for assistance 2/2 hip pain and braces due ot her muscular dystrophy. Presents today for elective left total hip replacement.

## 2021-07-23 NOTE — DISCHARGE NOTE PROVIDER - NSDCCPCAREPLAN_GEN_ALL_CORE_FT
PRINCIPAL DISCHARGE DIAGNOSIS  Diagnosis: Osteoarthritis of left hip  Assessment and Plan of Treatment: Osteoarthritis of left hip

## 2021-07-23 NOTE — OCCUPATIONAL THERAPY INITIAL EVALUATION ADULT - DIAGNOSIS, OT EVAL
Pt presents with decreased strength in the LLE and decreased standing balance impacting safety and independence with ADL's and Fx ambulation

## 2021-07-23 NOTE — PROGRESS NOTE ADULT - SUBJECTIVE AND OBJECTIVE BOX
Ortho Note    Afebrile overnight. Pt comfortable without complaints, pain controlled  Denies CP, SOB, N/V, numbness/tingling     Vital Signs Last 24 Hrs  T(C): 36.8 (07-23-21 @ 04:54), Max: 36.8 (07-23-21 @ 00:06)  T(F): 98.3 (07-23-21 @ 04:54), Max: 98.3 (07-23-21 @ 04:54)  HR: 66 (07-23-21 @ 04:54) (66 - 75)  BP: 105/61 (07-23-21 @ 04:54) (105/61 - 110/69)  BP(mean): --  RR: 15 (07-23-21 @ 04:54) (15 - 16)  SpO2: 98% (07-23-21 @ 04:54) (98% - 98%)  I&O's Summary    22 Jul 2021 07:01  -  23 Jul 2021 05:49  --------------------------------------------------------  IN: 240 mL / OUT: 750 mL / NET: -510 mL        General: Pt Alert and oriented, NAD  DSG C/D/I  Pulses:  Sensation:  Motor: EHL/FHL/TA/GS              A/P: 61yFemale s/p L CAITLIN 7/22  - Stable  - Pain Control  - Dressings: Aquacell and Primeo  - DVT ppx: ASA 81mg BID  - PT, WBS: WBAT LLE  - Dispo: Home with Home PT    Ortho Pager [phone] Ortho Note    Afebrile overnight. Pt comfortable without complaints, pain controlled  Denies CP, SOB, N/V, numbness/tingling     Vital Signs Last 24 Hrs  T(C): 36.8 (07-23-21 @ 04:54), Max: 36.8 (07-23-21 @ 00:06)  T(F): 98.3 (07-23-21 @ 04:54), Max: 98.3 (07-23-21 @ 04:54)  HR: 66 (07-23-21 @ 04:54) (66 - 75)  BP: 105/61 (07-23-21 @ 04:54) (105/61 - 110/69)  BP(mean): --  RR: 15 (07-23-21 @ 04:54) (15 - 16)  SpO2: 98% (07-23-21 @ 04:54) (98% - 98%)  I&O's Summary    22 Jul 2021 07:01  -  23 Jul 2021 05:49  --------------------------------------------------------  IN: 240 mL / OUT: 750 mL / NET: -510 mL      Physical Exam:  General: Pt Alert and oriented, NAD  LLE  DSG Aquacell with primeo C/D/I  Pulses: 2+ DP pulses WWP bilaterally  Sensation: SILT in bilateral LE  Motor: 5/5 EHL/FHL/GS, 0/5 TA (baseline, due to muscular dystrophy as per patient)              A/P: 61yFemale s/p L CAITLIN 7/22  - Stable  - Pain Control  - Dressings: Aquacell and Primeo  - DVT ppx: ASA 81mg BID  - PT, WBS: WBAT LLE  - Dispo: Home with Home PT    Ortho Pager [phone]

## 2021-07-24 LAB
ANION GAP SERPL CALC-SCNC: 3 MMOL/L — LOW (ref 5–17)
BUN SERPL-MCNC: 12 MG/DL — SIGNIFICANT CHANGE UP (ref 7–23)
CALCIUM SERPL-MCNC: 9.5 MG/DL — SIGNIFICANT CHANGE UP (ref 8.4–10.5)
CHLORIDE SERPL-SCNC: 103 MMOL/L — SIGNIFICANT CHANGE UP (ref 96–108)
CO2 SERPL-SCNC: 32 MMOL/L — HIGH (ref 22–31)
CREAT SERPL-MCNC: 0.84 MG/DL — SIGNIFICANT CHANGE UP (ref 0.5–1.3)
GLUCOSE SERPL-MCNC: 104 MG/DL — HIGH (ref 70–99)
HCT VFR BLD CALC: 27.2 % — LOW (ref 34.5–45)
HGB BLD-MCNC: 8.5 G/DL — LOW (ref 11.5–15.5)
MCHC RBC-ENTMCNC: 28.8 PG — SIGNIFICANT CHANGE UP (ref 27–34)
MCHC RBC-ENTMCNC: 31.3 GM/DL — LOW (ref 32–36)
MCV RBC AUTO: 92.2 FL — SIGNIFICANT CHANGE UP (ref 80–100)
NRBC # BLD: 0 /100 WBCS — SIGNIFICANT CHANGE UP (ref 0–0)
PLATELET # BLD AUTO: 138 K/UL — LOW (ref 150–400)
POTASSIUM SERPL-MCNC: 4.3 MMOL/L — SIGNIFICANT CHANGE UP (ref 3.5–5.3)
POTASSIUM SERPL-SCNC: 4.3 MMOL/L — SIGNIFICANT CHANGE UP (ref 3.5–5.3)
RBC # BLD: 2.95 M/UL — LOW (ref 3.8–5.2)
RBC # FLD: 13.5 % — SIGNIFICANT CHANGE UP (ref 10.3–14.5)
SODIUM SERPL-SCNC: 138 MMOL/L — SIGNIFICANT CHANGE UP (ref 135–145)
WBC # BLD: 7.56 K/UL — SIGNIFICANT CHANGE UP (ref 3.8–10.5)
WBC # FLD AUTO: 7.56 K/UL — SIGNIFICANT CHANGE UP (ref 3.8–10.5)

## 2021-07-24 PROCEDURE — 99233 SBSQ HOSP IP/OBS HIGH 50: CPT | Mod: GC

## 2021-07-24 RX ORDER — FERROUS SULFATE 325(65) MG
1 TABLET ORAL
Qty: 30 | Refills: 0
Start: 2021-07-24 | End: 2021-08-22

## 2021-07-24 RX ORDER — POLYETHYLENE GLYCOL 3350 17 G/17G
17 POWDER, FOR SOLUTION ORAL
Qty: 0 | Refills: 0 | DISCHARGE
Start: 2021-07-24

## 2021-07-24 RX ADMIN — POLYETHYLENE GLYCOL 3350 17 GRAM(S): 17 POWDER, FOR SOLUTION ORAL at 21:56

## 2021-07-24 RX ADMIN — PANTOPRAZOLE SODIUM 40 MILLIGRAM(S): 20 TABLET, DELAYED RELEASE ORAL at 05:20

## 2021-07-24 RX ADMIN — OXYCODONE HYDROCHLORIDE 10 MILLIGRAM(S): 5 TABLET ORAL at 17:22

## 2021-07-24 RX ADMIN — Medication 325 MILLIGRAM(S): at 10:14

## 2021-07-24 RX ADMIN — CITALOPRAM 20 MILLIGRAM(S): 10 TABLET, FILM COATED ORAL at 10:14

## 2021-07-24 RX ADMIN — Medication 975 MILLIGRAM(S): at 21:56

## 2021-07-24 RX ADMIN — Medication 975 MILLIGRAM(S): at 06:20

## 2021-07-24 RX ADMIN — Medication 975 MILLIGRAM(S): at 15:43

## 2021-07-24 RX ADMIN — Medication 975 MILLIGRAM(S): at 14:43

## 2021-07-24 RX ADMIN — OXYCODONE HYDROCHLORIDE 10 MILLIGRAM(S): 5 TABLET ORAL at 18:22

## 2021-07-24 RX ADMIN — OXYCODONE HYDROCHLORIDE 10 MILLIGRAM(S): 5 TABLET ORAL at 10:15

## 2021-07-24 RX ADMIN — Medication 81 MILLIGRAM(S): at 17:22

## 2021-07-24 RX ADMIN — OXYCODONE HYDROCHLORIDE 10 MILLIGRAM(S): 5 TABLET ORAL at 11:15

## 2021-07-24 RX ADMIN — SENNA PLUS 2 TABLET(S): 8.6 TABLET ORAL at 21:56

## 2021-07-24 RX ADMIN — Medication 975 MILLIGRAM(S): at 05:20

## 2021-07-24 RX ADMIN — OXYCODONE HYDROCHLORIDE 5 MILLIGRAM(S): 5 TABLET ORAL at 05:19

## 2021-07-24 RX ADMIN — Medication 975 MILLIGRAM(S): at 22:56

## 2021-07-24 RX ADMIN — Medication 81 MILLIGRAM(S): at 05:20

## 2021-07-24 RX ADMIN — OXYCODONE HYDROCHLORIDE 5 MILLIGRAM(S): 5 TABLET ORAL at 06:19

## 2021-07-24 NOTE — PROGRESS NOTE ADULT - SUBJECTIVE AND OBJECTIVE BOX
OVERNIGHT EVENTS:    SUBJECTIVE / INTERVAL HPI: Patient seen and examined at bedside. Pain controlled, slept well. Looking forward to 3 PM PT session to see if she can go home. concerned about the 49 steps she needs to get home.     VITAL SIGNS:  Vital Signs Last 24 Hrs  T(C): 36.8 (24 Jul 2021 09:03), Max: 37.1 (23 Jul 2021 20:10)  T(F): 98.3 (24 Jul 2021 09:03), Max: 98.8 (23 Jul 2021 20:10)  HR: 71 (24 Jul 2021 09:03) (60 - 74)  BP: 104/66 (24 Jul 2021 09:03) (88/50 - 113/69)  BP(mean): --  RR: 15 (24 Jul 2021 09:03) (15 - 18)  SpO2: 99% (24 Jul 2021 09:03) (97% - 99%)    PHYSICAL EXAM:    General: WDWN  HEENT: NC/AT; PERRL, anicteric sclera  Neck: supple  Cardiovascular: +S1/S2; RRR  Respiratory: CTA b/l; no W/R/R  Gastrointestinal: soft, NT/ND; +BSx4  Extremities: L hip dressing c/d/i  MEDICATIONS:  MEDICATIONS  (STANDING):  acetaminophen   Tablet .. 975 milliGRAM(s) Oral every 8 hours  aspirin  chewable 81 milliGRAM(s) Oral two times a day  BUpivacaine liposome 1.3% Injectable (no eMAR) 20 milliLiter(s) Local Injection once  chlorhexidine 2% Cloths 1 Application(s) Topical once  citalopram 20 milliGRAM(s) Oral daily  ferrous    sulfate 325 milliGRAM(s) Oral daily  lactated ringers. 1000 milliLiter(s) (100 mL/Hr) IV Continuous <Continuous>  pantoprazole    Tablet 40 milliGRAM(s) Oral before breakfast  polyethylene glycol 3350 17 Gram(s) Oral at bedtime  senna 2 Tablet(s) Oral at bedtime  spironolactone 100 milliGRAM(s) Oral daily    MEDICATIONS  (PRN):  bisacodyl Suppository 10 milliGRAM(s) Rectal once PRN Constipation  HYDROmorphone  Injectable 0.5 milliGRAM(s) IV Push every 4 hours PRN breakthrough pain  HYDROmorphone  Injectable 0.5 milliGRAM(s) IV Push every 15 minutes PRN breakthrough pain  magnesium hydroxide Suspension 30 milliLiter(s) Oral daily PRN Constipation  ondansetron Injectable 4 milliGRAM(s) IV Push every 6 hours PRN Nausea and/or Vomiting  oxyCODONE    IR 5 milliGRAM(s) Oral every 4 hours PRN Moderate Pain (4 - 6)  oxyCODONE    IR 10 milliGRAM(s) Oral every 4 hours PRN Severe Pain (7 - 10)      ALLERGIES:  Allergies    Carrots (Unknown)  No Known Drug Allergies    Intolerances        LABS:                        8.5    7.56  )-----------( 138      ( 24 Jul 2021 08:08 )             27.2     07-24    138  |  103  |  12  ----------------------------<  104<H>  4.3   |  32<H>  |  0.84    Ca    9.5      24 Jul 2021 08:08          CAPILLARY BLOOD GLUCOSE          RADIOLOGY & ADDITIONAL TESTS: Reviewed.    ASSESSMENT:    PLAN:

## 2021-07-24 NOTE — PROGRESS NOTE ADULT - SUBJECTIVE AND OBJECTIVE BOX
Ortho Note    Procedure: L CAITLIN (anterior)   Surgeon: Oh  Procedure Date: 7/22/21     Afebrile overnight. Pt comfortable without complaints, pain controlled  Denies CP, SOB, N/V, numbness/tingling     Vital Signs Last 24 Hrs  T(C): 36.7 (24 Jul 2021 05:13), Max: 37.1 (23 Jul 2021 20:10)  T(F): 98.1 (24 Jul 2021 05:13), Max: 98.8 (23 Jul 2021 20:10)  HR: 74 (24 Jul 2021 05:13) (60 - 74)  BP: 98/60 (24 Jul 2021 05:13) (88/50 - 113/69)  BP(mean): --  RR: 16 (24 Jul 2021 05:13) (16 - 18)  SpO2: 98% (24 Jul 2021 05:13) (97% - 99%)      Physical Exam:  General: Pt Alert and oriented, NAD  LLE  DSG Aquacel  C/D/I  Pulses: 2+ PT pulses WWP bilaterally  Sensation: SILT s/s/sp/dp/ t bilateral LE  Motor: 5/5 EHL/FHL/GS, 1/5 TA (baseline, due to muscular dystrophy as per patient)    A/P: 61yFemale s/p L CAITLIN 7/22  - Stable  - Pain Control  - Dressings: Aquacel  - DVT ppx: ASA 81mg BID  - PT, WBS: WBAT LLE  - Dispo: Home with Home PT    Ortho Pager [phone]

## 2021-07-24 NOTE — PROGRESS NOTE ADULT - TIME BILLING
Counseling provided with time spent for DVT prophylaxis, anemia, and medication education.  Time spent discussing care with primary team. Greater than 60 minutes spent on total encounter; more than 50% of the visit was spent counseling and/or coordinating care by the attending physician.

## 2021-07-24 NOTE — PROGRESS NOTE ADULT - ASSESSMENT
61F w h/o anxiety, aplopecia, muscular dystrophy affecting b/l calves, L hip OA here for elective L CAITLIN 7/22 w Dr. Sahu    #Post-op state - pain is controlled. PPx: ASA 81 BID per orthopedics. On bowel regimen.   #Blood loss anemia - H/H stable, c/w iron  #Thrombocytopenia - stable  #Left Hip OA - s/p L CAITLIN - mgmt per orthopedics  #Anxiety - chronic. stable. c/w home citalopram 20  #Muscular dystrophy - chronic. stable. Follows w Dr. Fierro  #Alopecia - c/w aldactone 100mg  #GERD - c/w protonix

## 2021-07-25 ENCOUNTER — TRANSCRIPTION ENCOUNTER (OUTPATIENT)
Age: 61
End: 2021-07-25

## 2021-07-25 VITALS
TEMPERATURE: 99 F | SYSTOLIC BLOOD PRESSURE: 125 MMHG | RESPIRATION RATE: 14 BRPM | OXYGEN SATURATION: 98 % | HEART RATE: 72 BPM | DIASTOLIC BLOOD PRESSURE: 74 MMHG

## 2021-07-25 LAB
ANION GAP SERPL CALC-SCNC: 7 MMOL/L — SIGNIFICANT CHANGE UP (ref 5–17)
BUN SERPL-MCNC: 8 MG/DL — SIGNIFICANT CHANGE UP (ref 7–23)
CALCIUM SERPL-MCNC: 9.7 MG/DL — SIGNIFICANT CHANGE UP (ref 8.4–10.5)
CHLORIDE SERPL-SCNC: 103 MMOL/L — SIGNIFICANT CHANGE UP (ref 96–108)
CO2 SERPL-SCNC: 31 MMOL/L — SIGNIFICANT CHANGE UP (ref 22–31)
CREAT SERPL-MCNC: 0.76 MG/DL — SIGNIFICANT CHANGE UP (ref 0.5–1.3)
GLUCOSE SERPL-MCNC: 102 MG/DL — HIGH (ref 70–99)
HCT VFR BLD CALC: 30.1 % — LOW (ref 34.5–45)
HGB BLD-MCNC: 9.6 G/DL — LOW (ref 11.5–15.5)
MCHC RBC-ENTMCNC: 29.5 PG — SIGNIFICANT CHANGE UP (ref 27–34)
MCHC RBC-ENTMCNC: 31.9 GM/DL — LOW (ref 32–36)
MCV RBC AUTO: 92.6 FL — SIGNIFICANT CHANGE UP (ref 80–100)
NRBC # BLD: 0 /100 WBCS — SIGNIFICANT CHANGE UP (ref 0–0)
PLATELET # BLD AUTO: 148 K/UL — LOW (ref 150–400)
POTASSIUM SERPL-MCNC: 4.3 MMOL/L — SIGNIFICANT CHANGE UP (ref 3.5–5.3)
POTASSIUM SERPL-SCNC: 4.3 MMOL/L — SIGNIFICANT CHANGE UP (ref 3.5–5.3)
RBC # BLD: 3.25 M/UL — LOW (ref 3.8–5.2)
RBC # FLD: 13.2 % — SIGNIFICANT CHANGE UP (ref 10.3–14.5)
SODIUM SERPL-SCNC: 141 MMOL/L — SIGNIFICANT CHANGE UP (ref 135–145)
WBC # BLD: 7.18 K/UL — SIGNIFICANT CHANGE UP (ref 3.8–10.5)
WBC # FLD AUTO: 7.18 K/UL — SIGNIFICANT CHANGE UP (ref 3.8–10.5)

## 2021-07-25 PROCEDURE — 83540 ASSAY OF IRON: CPT

## 2021-07-25 PROCEDURE — 97116 GAIT TRAINING THERAPY: CPT

## 2021-07-25 PROCEDURE — 97530 THERAPEUTIC ACTIVITIES: CPT

## 2021-07-25 PROCEDURE — 85027 COMPLETE CBC AUTOMATED: CPT

## 2021-07-25 PROCEDURE — 86769 SARS-COV-2 COVID-19 ANTIBODY: CPT

## 2021-07-25 PROCEDURE — 86850 RBC ANTIBODY SCREEN: CPT

## 2021-07-25 PROCEDURE — 82728 ASSAY OF FERRITIN: CPT

## 2021-07-25 PROCEDURE — 97161 PT EVAL LOW COMPLEX 20 MIN: CPT

## 2021-07-25 PROCEDURE — C1776: CPT

## 2021-07-25 PROCEDURE — 83550 IRON BINDING TEST: CPT

## 2021-07-25 PROCEDURE — 85045 AUTOMATED RETICULOCYTE COUNT: CPT

## 2021-07-25 PROCEDURE — 36415 COLL VENOUS BLD VENIPUNCTURE: CPT

## 2021-07-25 PROCEDURE — 86803 HEPATITIS C AB TEST: CPT

## 2021-07-25 PROCEDURE — 76000 FLUOROSCOPY <1 HR PHYS/QHP: CPT

## 2021-07-25 PROCEDURE — 86900 BLOOD TYPING SEROLOGIC ABO: CPT

## 2021-07-25 PROCEDURE — 84466 ASSAY OF TRANSFERRIN: CPT

## 2021-07-25 PROCEDURE — 86901 BLOOD TYPING SEROLOGIC RH(D): CPT

## 2021-07-25 PROCEDURE — 80048 BASIC METABOLIC PNL TOTAL CA: CPT

## 2021-07-25 RX ADMIN — Medication 81 MILLIGRAM(S): at 05:06

## 2021-07-25 RX ADMIN — Medication 975 MILLIGRAM(S): at 06:06

## 2021-07-25 RX ADMIN — PANTOPRAZOLE SODIUM 40 MILLIGRAM(S): 20 TABLET, DELAYED RELEASE ORAL at 05:06

## 2021-07-25 RX ADMIN — CITALOPRAM 20 MILLIGRAM(S): 10 TABLET, FILM COATED ORAL at 12:36

## 2021-07-25 RX ADMIN — Medication 975 MILLIGRAM(S): at 05:06

## 2021-07-25 RX ADMIN — Medication 325 MILLIGRAM(S): at 12:36

## 2021-07-25 NOTE — DISCHARGE NOTE NURSING/CASE MANAGEMENT/SOCIAL WORK - PATIENT PORTAL LINK FT
You can access the FollowMyHealth Patient Portal offered by [facility] by registering at the following website: [email or web address]. By joining [employer]’s FollowMyHealth portal, you will also be able to view your health information using other applications (apps) compatible with our system.

## 2021-07-25 NOTE — PROGRESS NOTE ADULT - SUBJECTIVE AND OBJECTIVE BOX
Ortho Note    Procedure: L CAITLIN (anterior)   Surgeon: Oh  Procedure Date: 7/22/21     Afebrile overnight. Pt comfortable without complaints, pain controlled  Denies CP, SOB, N/V, numbness/tingling     Vital Signs Last 24 Hrs  T(C): 36.5 (25 Jul 2021 04:59), Max: 37.1 (24 Jul 2021 20:19)  T(F): 97.7 (25 Jul 2021 04:59), Max: 98.8 (24 Jul 2021 20:19)  HR: 73 (25 Jul 2021 04:59) (71 - 87)  BP: 97/60 (25 Jul 2021 04:59) (97/58 - 119/68)  BP(mean): --  RR: 16 (25 Jul 2021 04:59) (15 - 16)  SpO2: 98% (25 Jul 2021 04:59) (95% - 99%)      Physical Exam:  General: Pt Alert and oriented, NAD  LLE  DSG Aquacel  C/D/I  Pulses: 2+ PT pulses WWP bilaterally  Sensation: SILT s/s/sp/dp/ t bilateral LE  Motor: 5/5 EHL/FHL/GS, 1/5 TA (baseline, due to muscular dystrophy as per patient)    A/P: 61yFemale s/p L CAITLIN 7/22  - Stable  - Pain Control  - Dressings: Aquacel  - DVT ppx: ASA 81mg BID  - PT, WBS: WBAT LLE  - Dispo: Home with Home PT    Ortho Pager [phone]

## 2021-07-26 ENCOUNTER — TRANSCRIPTION ENCOUNTER (OUTPATIENT)
Age: 61
End: 2021-07-26

## 2021-07-26 DIAGNOSIS — Z71.89 OTHER SPECIFIED COUNSELING: ICD-10-CM

## 2021-07-27 ENCOUNTER — NON-APPOINTMENT (OUTPATIENT)
Age: 61
End: 2021-07-27

## 2021-07-29 DIAGNOSIS — K21.9 GASTRO-ESOPHAGEAL REFLUX DISEASE WITHOUT ESOPHAGITIS: ICD-10-CM

## 2021-07-29 DIAGNOSIS — M21.371 FOOT DROP, RIGHT FOOT: ICD-10-CM

## 2021-07-29 DIAGNOSIS — D50.0 IRON DEFICIENCY ANEMIA SECONDARY TO BLOOD LOSS (CHRONIC): ICD-10-CM

## 2021-07-29 DIAGNOSIS — G71.00 MUSCULAR DYSTROPHY, UNSPECIFIED: ICD-10-CM

## 2021-07-29 DIAGNOSIS — F41.9 ANXIETY DISORDER, UNSPECIFIED: ICD-10-CM

## 2021-07-29 DIAGNOSIS — M16.12 UNILATERAL PRIMARY OSTEOARTHRITIS, LEFT HIP: ICD-10-CM

## 2021-07-29 DIAGNOSIS — D69.6 THROMBOCYTOPENIA, UNSPECIFIED: ICD-10-CM

## 2021-07-29 DIAGNOSIS — L65.9 NONSCARRING HAIR LOSS, UNSPECIFIED: ICD-10-CM

## 2021-07-29 DIAGNOSIS — M21.372 FOOT DROP, LEFT FOOT: ICD-10-CM

## 2021-07-29 PROBLEM — Z86.69 PERSONAL HISTORY OF OTHER DISEASES OF THE NERVOUS SYSTEM AND SENSE ORGANS: Chronic | Status: ACTIVE | Noted: 2021-07-22

## 2021-07-29 PROBLEM — M16.9 OSTEOARTHRITIS OF HIP, UNSPECIFIED: Chronic | Status: ACTIVE | Noted: 2021-07-21

## 2021-08-03 ENCOUNTER — TRANSCRIPTION ENCOUNTER (OUTPATIENT)
Age: 61
End: 2021-08-03

## 2021-08-04 ENCOUNTER — TRANSCRIPTION ENCOUNTER (OUTPATIENT)
Age: 61
End: 2021-08-04

## 2021-08-10 ENCOUNTER — APPOINTMENT (OUTPATIENT)
Dept: ORTHOPEDIC SURGERY | Facility: CLINIC | Age: 61
End: 2021-08-10
Payer: MEDICAID

## 2021-08-10 DIAGNOSIS — Z47.1 AFTERCARE FOLLOWING JOINT REPLACEMENT SURGERY: ICD-10-CM

## 2021-08-10 DIAGNOSIS — Z96.642 AFTERCARE FOLLOWING JOINT REPLACEMENT SURGERY: ICD-10-CM

## 2021-08-10 PROCEDURE — 99024 POSTOP FOLLOW-UP VISIT: CPT

## 2021-08-10 NOTE — HISTORY OF PRESENT ILLNESS
[de-identified] : 62y/o female on 1st POA s/p L CAITLIN, DOS 7/22/21 [de-identified] : Doing well. Pain controlled with Tylenol, no opioids. Has been compliant with ASA. No wound or systemic complaints. Went from walker to cane yesterday, still doesn't seem to trust the hip. Finished home PT last week. Still planning to go up to [place] in 2 weeks. [de-identified] : L hip incision healed, benign appearing. Ambulating with cane, cautious on the left but not antalgic. Reduced stride length and josee. [de-identified] : 60y/o 2.5wk s/p L CAITLIN, doing well [de-identified] : Ref given for outpatient PT\par Cont HEP\par Wean cane as tolerated\par She will follow up at [place] for ongoing care. She may follow up with me whenever back in [facility] with new L hip XRs.

## 2021-08-17 ENCOUNTER — APPOINTMENT (OUTPATIENT)
Dept: DERMATOLOGY | Facility: CLINIC | Age: 61
End: 2021-08-17
Payer: COMMERCIAL

## 2021-08-17 DIAGNOSIS — Z79.899 OTHER LONG TERM (CURRENT) DRUG THERAPY: ICD-10-CM

## 2021-08-17 PROCEDURE — 99214 OFFICE O/P EST MOD 30 MIN: CPT

## 2021-08-27 ENCOUNTER — TRANSCRIPTION ENCOUNTER (OUTPATIENT)
Age: 61
End: 2021-08-27

## 2021-08-31 ENCOUNTER — NON-APPOINTMENT (OUTPATIENT)
Age: 61
End: 2021-08-31

## 2021-09-14 ENCOUNTER — NON-APPOINTMENT (OUTPATIENT)
Age: 61
End: 2021-09-14

## 2021-09-16 ENCOUNTER — NON-APPOINTMENT (OUTPATIENT)
Age: 61
End: 2021-09-16

## 2021-10-01 PROCEDURE — G9005: CPT

## 2021-11-01 ENCOUNTER — OUTPATIENT (OUTPATIENT)
Dept: OUTPATIENT SERVICES | Facility: HOSPITAL | Age: 61
LOS: 1 days | End: 2021-11-01

## 2021-11-30 DIAGNOSIS — Z71.89 OTHER SPECIFIED COUNSELING: ICD-10-CM

## 2022-05-24 NOTE — DISCHARGE NOTE PROVIDER - NSDCHHATTENDCERT_GEN_ALL_CORE
My signature below certifies that the above stated patient is homebound and upon completion of the Face-To-Face encounter, has the need for intermittent skilled nursing, physical therapy and/or speech or occupational therapy services in their home for their current diagnosis as outlined in their initial plan of care. These services will continue to be monitored by myself or another physician.
Jaziel Perez)  Pediatrics  [address], [address]  [place] [address]  Phone: [phone]  Fax: [phone]  Follow Up Time: 1-3 days

## 2022-08-11 ENCOUNTER — NON-APPOINTMENT (OUTPATIENT)
Age: 62
End: 2022-08-11

## 2023-04-17 NOTE — REASON FOR VISIT
Pt admitted to [facility] room 14 and oriented to unit. SCD sleeves applied. Nares swabbed. Pt verbalized permission for first name, last initial and physicians name on white board. SDS board and discharge criteria explained, pt and family verbalized understanding. Pt denies thoughts of harming self or others. Call light in reach. Family at the bedside. [Follow-Up Visit] : a follow-up visit for [Other: ____] : [unfilled]

## 2023-07-17 ENCOUNTER — APPOINTMENT (OUTPATIENT)
Dept: INTERNAL MEDICINE | Facility: CLINIC | Age: 63
End: 2023-07-17
Payer: MEDICAID

## 2023-07-17 ENCOUNTER — NON-APPOINTMENT (OUTPATIENT)
Age: 63
End: 2023-07-17

## 2023-07-17 VITALS
WEIGHT: 108.25 LBS | BODY MASS INDEX: 18.03 KG/M2 | SYSTOLIC BLOOD PRESSURE: 107 MMHG | DIASTOLIC BLOOD PRESSURE: 71 MMHG | TEMPERATURE: 97.8 F | HEIGHT: 65 IN | OXYGEN SATURATION: 98 % | HEART RATE: 65 BPM

## 2023-07-17 DIAGNOSIS — M85.80 OTHER SPECIFIED DISORDERS OF BONE DENSITY AND STRUCTURE, UNSPECIFIED SITE: ICD-10-CM

## 2023-07-17 DIAGNOSIS — Z12.39 ENCOUNTER FOR OTHER SCREENING FOR MALIGNANT NEOPLASM OF BREAST: ICD-10-CM

## 2023-07-17 DIAGNOSIS — N95.2 POSTMENOPAUSAL ATROPHIC VAGINITIS: ICD-10-CM

## 2023-07-17 DIAGNOSIS — R41.89 OTHER SYMPTOMS AND SIGNS INVOLVING COGNITIVE FUNCTIONS AND AWARENESS: ICD-10-CM

## 2023-07-17 DIAGNOSIS — R20.2 PARESTHESIA OF SKIN: ICD-10-CM

## 2023-07-17 DIAGNOSIS — N76.2 ACUTE VULVITIS: ICD-10-CM

## 2023-07-17 DIAGNOSIS — E53.8 DEFICIENCY OF OTHER SPECIFIED B GROUP VITAMINS: ICD-10-CM

## 2023-07-17 DIAGNOSIS — Z78.0 ASYMPTOMATIC MENOPAUSAL STATE: ICD-10-CM

## 2023-07-17 DIAGNOSIS — H91.90 UNSPECIFIED HEARING LOSS, UNSPECIFIED EAR: ICD-10-CM

## 2023-07-17 DIAGNOSIS — Z00.00 ENCOUNTER FOR GENERAL ADULT MEDICAL EXAMINATION W/OUT ABNORMAL FINDINGS: ICD-10-CM

## 2023-07-17 PROCEDURE — 93000 ELECTROCARDIOGRAM COMPLETE: CPT

## 2023-07-17 PROCEDURE — 99396 PREV VISIT EST AGE 40-64: CPT | Mod: 25

## 2023-07-17 PROCEDURE — 99212 OFFICE O/P EST SF 10 MIN: CPT | Mod: 25

## 2023-07-17 RX ORDER — NITROFURANTOIN (MONOHYDRATE/MACROCRYSTALS) 25; 75 MG/1; MG/1
100 CAPSULE ORAL
Qty: 10 | Refills: 0 | Status: COMPLETED | COMMUNITY
Start: 2021-07-16 | End: 2023-07-17

## 2023-07-17 RX ORDER — ESTRADIOL 0.1 MG/G
0.1 CREAM VAGINAL
Qty: 1 | Refills: 2 | Status: ACTIVE | COMMUNITY
Start: 2023-07-17 | End: 1900-01-01

## 2023-07-17 RX ORDER — SPIRONOLACTONE 50 MG/1
50 TABLET ORAL
Qty: 28 | Refills: 0 | Status: COMPLETED | COMMUNITY
Start: 2021-05-24 | End: 2023-07-17

## 2023-07-17 RX ORDER — ESTRADIOL 0.75 MG/1.25G
0.75 MG/1.25 GM GEL, METERED TOPICAL
Refills: 0 | Status: COMPLETED | COMMUNITY
Start: 2021-04-09 | End: 2023-07-17

## 2023-07-17 RX ORDER — HALOBETASOL PROPIONATE 0.5 MG/G
0.05 CREAM TOPICAL
Qty: 1 | Refills: 1 | Status: ACTIVE | COMMUNITY
Start: 2023-07-17 | End: 1900-01-01

## 2023-07-25 ENCOUNTER — APPOINTMENT (OUTPATIENT)
Dept: OBGYN | Facility: CLINIC | Age: 63
End: 2023-07-25

## 2023-07-26 ENCOUNTER — APPOINTMENT (OUTPATIENT)
Dept: OTOLARYNGOLOGY | Facility: CLINIC | Age: 63
End: 2023-07-26
Payer: MEDICAID

## 2023-07-26 VITALS
OXYGEN SATURATION: 98 % | HEART RATE: 67 BPM | HEIGHT: 65 IN | TEMPERATURE: 97.9 F | DIASTOLIC BLOOD PRESSURE: 74 MMHG | BODY MASS INDEX: 18.16 KG/M2 | SYSTOLIC BLOOD PRESSURE: 112 MMHG | WEIGHT: 109 LBS

## 2023-07-26 DIAGNOSIS — H93.299 OTHER ABNORMAL AUDITORY PERCEPTIONS, UNSPECIFIED EAR: ICD-10-CM

## 2023-07-26 DIAGNOSIS — H61.21 IMPACTED CERUMEN, RIGHT EAR: ICD-10-CM

## 2023-07-26 PROCEDURE — 99204 OFFICE O/P NEW MOD 45 MIN: CPT | Mod: 25

## 2023-07-26 PROCEDURE — 92550 TYMPANOMETRY & REFLEX THRESH: CPT | Mod: 52

## 2023-07-26 PROCEDURE — 92557 COMPREHENSIVE HEARING TEST: CPT

## 2023-07-28 ENCOUNTER — TRANSCRIPTION ENCOUNTER (OUTPATIENT)
Age: 63
End: 2023-07-28

## 2023-08-07 ENCOUNTER — TRANSCRIPTION ENCOUNTER (OUTPATIENT)
Age: 63
End: 2023-08-07

## 2023-08-07 LAB
25(OH)D3 SERPL-MCNC: 22.3 NG/ML
ALBUMIN SERPL ELPH-MCNC: 5 G/DL
ALP BLD-CCNC: 67 U/L
ALT SERPL-CCNC: 16 U/L
ANION GAP SERPL CALC-SCNC: 10 MMOL/L
APPEARANCE: CLEAR
AST SERPL-CCNC: 24 U/L
BACTERIA: NEGATIVE /HPF
BASOPHILS # BLD AUTO: 0.05 K/UL
BASOPHILS NFR BLD AUTO: 1 %
BILIRUB SERPL-MCNC: 0.4 MG/DL
BILIRUBIN URINE: NEGATIVE
BLOOD URINE: NEGATIVE
BUN SERPL-MCNC: 14 MG/DL
CALCIUM SERPL-MCNC: 10.6 MG/DL
CAST: 0 /LPF
CHLORIDE SERPL-SCNC: 100 MMOL/L
CHOLEST SERPL-MCNC: 227 MG/DL
CO2 SERPL-SCNC: 28 MMOL/L
COLOR: YELLOW
CREAT SERPL-MCNC: 0.96 MG/DL
EGFR: 67 ML/MIN/1.73M2
EOSINOPHIL # BLD AUTO: 0.47 K/UL
EOSINOPHIL NFR BLD AUTO: 9.2 %
EPITHELIAL CELLS: 0 /HPF
ESTIMATED AVERAGE GLUCOSE: 117 MG/DL
ESTRADIOL SERPL-MCNC: 6 PG/ML
FERRITIN SERPL-MCNC: 52 NG/ML
FOLATE SERPL-MCNC: 10.7 NG/ML
FSH SERPL-MCNC: 98.5 IU/L
GLUCOSE QUALITATIVE U: NEGATIVE MG/DL
GLUCOSE SERPL-MCNC: 88 MG/DL
HBA1C MFR BLD HPLC: 5.7 %
HCT VFR BLD CALC: 41.8 %
HDLC SERPL-MCNC: 85 MG/DL
HGB BLD-MCNC: 12.9 G/DL
IMM GRANULOCYTES NFR BLD AUTO: 0.2 %
IRON SATN MFR SERPL: 20 %
IRON SERPL-MCNC: 73 UG/DL
KETONES URINE: NEGATIVE MG/DL
LDLC SERPL CALC-MCNC: 125 MG/DL
LEUKOCYTE ESTERASE URINE: ABNORMAL
LH SERPL-ACNC: 44.9 IU/L
LYMPHOCYTES # BLD AUTO: 1.78 K/UL
LYMPHOCYTES NFR BLD AUTO: 34.7 %
MAN DIFF?: NORMAL
MCHC RBC-ENTMCNC: 30.3 PG
MCHC RBC-ENTMCNC: 30.9 GM/DL
MCV RBC AUTO: 98.1 FL
MICROSCOPIC-UA: NORMAL
MONOCYTES # BLD AUTO: 0.44 K/UL
MONOCYTES NFR BLD AUTO: 8.6 %
NEUTROPHILS # BLD AUTO: 2.38 K/UL
NEUTROPHILS NFR BLD AUTO: 46.3 %
NITRITE URINE: POSITIVE
NONHDLC SERPL-MCNC: 143 MG/DL
PH URINE: 7
PLATELET # BLD AUTO: 200 K/UL
POTASSIUM SERPL-SCNC: 5.2 MMOL/L
PROGEST SERPL-MCNC: 0.3 NG/ML
PROT SERPL-MCNC: 7.5 G/DL
PROTEIN URINE: NEGATIVE MG/DL
RBC # BLD: 4.26 M/UL
RBC # FLD: 13.2 %
RED BLOOD CELLS URINE: NORMAL /HPF
REVIEW: NORMAL
SODIUM SERPL-SCNC: 137 MMOL/L
SPECIFIC GRAVITY URINE: 1.01
TESTOST SERPL-MCNC: <2.5 NG/DL
TIBC SERPL-MCNC: 367 UG/DL
TRIGL SERPL-MCNC: 100 MG/DL
TSH SERPL-ACNC: 1.2 UIU/ML
UIBC SERPL-MCNC: 294 UG/DL
UROBILINOGEN URINE: 0.2 MG/DL
VIT B12 SERPL-MCNC: 555 PG/ML
WBC # FLD AUTO: 5.13 K/UL
WHITE BLOOD CELLS URINE: 1 /HPF

## 2023-08-07 NOTE — HISTORY OF PRESENT ILLNESS
[FreeTextEntry1] : 62 yo woman here for CPE [de-identified] : Fatigue/brain fog. Sometimes waking up in the morning with heavy head, still wanting to go back to bed. Other days, in the afternoon feels like she's too tired.

## 2023-08-07 NOTE — HEALTH RISK ASSESSMENT
[0] : 2) Feeling down, depressed, or hopeless: Not at all (0) [Patient reported colonoscopy was normal] : Patient reported colonoscopy was normal [Never] : Never [[id number]] : 0 [ColonoscopyDate] : 01/23

## 2023-09-18 NOTE — HISTORY OF PRESENT ILLNESS
[FreeTextEntry1] : Last seen 6 months ago\par She got braces for foot drop - feels they are causing hip pain, are uncomfortable, but definitely help decrease tripping \par She is using a cane because it feels like the brace makes her lean forward\par She's also having muscle pain in thighs, "spasms" in the calves", "rippling" in the anterior tibial muscles
No

## 2023-10-04 ENCOUNTER — TRANSCRIPTION ENCOUNTER (OUTPATIENT)
Age: 63
End: 2023-10-04

## 2023-10-10 ENCOUNTER — APPOINTMENT (OUTPATIENT)
Dept: SLEEP CENTER | Facility: HOME HEALTH | Age: 63
End: 2023-10-10
Payer: MEDICAID

## 2023-10-10 ENCOUNTER — OUTPATIENT (OUTPATIENT)
Dept: OUTPATIENT SERVICES | Facility: HOSPITAL | Age: 63
LOS: 1 days | End: 2023-10-10
Payer: COMMERCIAL

## 2023-10-10 PROCEDURE — 95800 SLP STDY UNATTENDED: CPT | Mod: 26

## 2023-10-10 PROCEDURE — 95800 SLP STDY UNATTENDED: CPT

## 2023-10-11 DIAGNOSIS — G47.33 OBSTRUCTIVE SLEEP APNEA (ADULT) (PEDIATRIC): ICD-10-CM

## 2023-10-17 ENCOUNTER — TRANSCRIPTION ENCOUNTER (OUTPATIENT)
Age: 63
End: 2023-10-17

## 2023-10-20 ENCOUNTER — APPOINTMENT (OUTPATIENT)
Dept: INTERNAL MEDICINE | Facility: CLINIC | Age: 63
End: 2023-10-20
Payer: MEDICAID

## 2023-10-20 VITALS
DIASTOLIC BLOOD PRESSURE: 77 MMHG | WEIGHT: 111.6 LBS | HEART RATE: 70 BPM | HEIGHT: 65 IN | TEMPERATURE: 97.9 F | BODY MASS INDEX: 18.59 KG/M2 | SYSTOLIC BLOOD PRESSURE: 113 MMHG | OXYGEN SATURATION: 98 %

## 2023-10-20 DIAGNOSIS — Z23 ENCOUNTER FOR IMMUNIZATION: ICD-10-CM

## 2023-10-20 DIAGNOSIS — L65.9 NONSCARRING HAIR LOSS, UNSPECIFIED: ICD-10-CM

## 2023-10-20 DIAGNOSIS — R53.83 OTHER FATIGUE: ICD-10-CM

## 2023-10-20 PROCEDURE — G0008: CPT

## 2023-10-20 PROCEDURE — 99214 OFFICE O/P EST MOD 30 MIN: CPT | Mod: 25

## 2023-10-20 PROCEDURE — 90686 IIV4 VACC NO PRSV 0.5 ML IM: CPT

## 2023-10-20 RX ORDER — OXYCODONE 5 MG/1
5 TABLET ORAL
Qty: 50 | Refills: 0 | Status: COMPLETED | COMMUNITY
Start: 2021-07-21 | End: 2023-10-20

## 2023-11-02 ENCOUNTER — APPOINTMENT (OUTPATIENT)
Dept: HEART AND VASCULAR | Facility: CLINIC | Age: 63
End: 2023-11-02
Payer: MEDICAID

## 2023-11-02 ENCOUNTER — NON-APPOINTMENT (OUTPATIENT)
Age: 63
End: 2023-11-02

## 2023-11-02 VITALS
DIASTOLIC BLOOD PRESSURE: 62 MMHG | WEIGHT: 111 LBS | BODY MASS INDEX: 18.49 KG/M2 | HEIGHT: 65 IN | HEART RATE: 88 BPM | TEMPERATURE: 97.7 F | SYSTOLIC BLOOD PRESSURE: 104 MMHG | OXYGEN SATURATION: 96 %

## 2023-11-02 DIAGNOSIS — E78.5 HYPERLIPIDEMIA, UNSPECIFIED: ICD-10-CM

## 2023-11-02 PROCEDURE — 99204 OFFICE O/P NEW MOD 45 MIN: CPT | Mod: 25

## 2023-11-02 PROCEDURE — 93000 ELECTROCARDIOGRAM COMPLETE: CPT

## 2023-11-03 LAB
CHOLEST SERPL-MCNC: 225 MG/DL
HDLC SERPL-MCNC: 99 MG/DL
LDLC SERPL CALC-MCNC: 119 MG/DL
NONHDLC SERPL-MCNC: 126 MG/DL
TRIGL SERPL-MCNC: 41 MG/DL

## 2023-11-13 ENCOUNTER — TRANSCRIPTION ENCOUNTER (OUTPATIENT)
Age: 63
End: 2023-11-13

## 2023-11-13 DIAGNOSIS — M25.551 PAIN IN RIGHT HIP: ICD-10-CM

## 2023-11-14 ENCOUNTER — APPOINTMENT (OUTPATIENT)
Dept: HEART AND VASCULAR | Facility: CLINIC | Age: 63
End: 2023-11-14
Payer: MEDICAID

## 2023-11-14 PROCEDURE — 93306 TTE W/DOPPLER COMPLETE: CPT

## 2023-11-15 ENCOUNTER — NON-APPOINTMENT (OUTPATIENT)
Age: 63
End: 2023-11-15

## 2023-11-16 ENCOUNTER — APPOINTMENT (OUTPATIENT)
Dept: DERMATOLOGY | Facility: CLINIC | Age: 63
End: 2023-11-16
Payer: MEDICAID

## 2023-11-16 DIAGNOSIS — Z12.83 ENCOUNTER FOR SCREENING FOR MALIGNANT NEOPLASM OF SKIN: ICD-10-CM

## 2023-11-16 DIAGNOSIS — L64.9 ANDROGENIC ALOPECIA, UNSPECIFIED: ICD-10-CM

## 2023-11-16 DIAGNOSIS — R23.8 OTHER SKIN CHANGES: ICD-10-CM

## 2023-11-16 DIAGNOSIS — D23.9 OTHER BENIGN NEOPLASM OF SKIN, UNSPECIFIED: ICD-10-CM

## 2023-11-16 DIAGNOSIS — D22.9 MELANOCYTIC NEVI, UNSPECIFIED: ICD-10-CM

## 2023-11-16 PROCEDURE — 99214 OFFICE O/P EST MOD 30 MIN: CPT

## 2023-11-20 ENCOUNTER — TRANSCRIPTION ENCOUNTER (OUTPATIENT)
Age: 63
End: 2023-11-20

## 2023-11-20 RX ORDER — TRETINOIN 0.25 MG/G
0.03 CREAM TOPICAL
Qty: 1 | Refills: 4 | Status: ACTIVE | COMMUNITY
Start: 2023-11-16 | End: 1900-01-01

## 2023-11-27 ENCOUNTER — OUTPATIENT (OUTPATIENT)
Dept: OUTPATIENT SERVICES | Facility: HOSPITAL | Age: 63
LOS: 1 days | End: 2023-11-27
Payer: COMMERCIAL

## 2023-11-27 ENCOUNTER — APPOINTMENT (OUTPATIENT)
Dept: ORTHOPEDIC SURGERY | Facility: CLINIC | Age: 63
End: 2023-11-27
Payer: MEDICAID

## 2023-11-27 ENCOUNTER — RESULT REVIEW (OUTPATIENT)
Age: 63
End: 2023-11-27

## 2023-11-27 VITALS
HEIGHT: 65 IN | SYSTOLIC BLOOD PRESSURE: 115 MMHG | HEART RATE: 83 BPM | OXYGEN SATURATION: 95 % | DIASTOLIC BLOOD PRESSURE: 78 MMHG | BODY MASS INDEX: 18.49 KG/M2 | WEIGHT: 111 LBS

## 2023-11-27 DIAGNOSIS — M16.11 UNILATERAL PRIMARY OSTEOARTHRITIS, RIGHT HIP: ICD-10-CM

## 2023-11-27 DIAGNOSIS — Z96.642 PRESENCE OF LEFT ARTIFICIAL HIP JOINT: ICD-10-CM

## 2023-11-27 PROCEDURE — 99214 OFFICE O/P EST MOD 30 MIN: CPT

## 2023-11-27 PROCEDURE — 73522 X-RAY EXAM HIPS BI 3-4 VIEWS: CPT

## 2023-11-27 PROCEDURE — 73522 X-RAY EXAM HIPS BI 3-4 VIEWS: CPT | Mod: 26

## 2023-12-15 ENCOUNTER — NON-APPOINTMENT (OUTPATIENT)
Age: 63
End: 2023-12-15

## 2023-12-15 LAB
ALBUMIN SERPL ELPH-MCNC: 4.7 G/DL
ALP BLD-CCNC: 65 U/L
ALT SERPL-CCNC: 16 U/L
ANION GAP SERPL CALC-SCNC: 10 MMOL/L
AST SERPL-CCNC: 26 U/L
BASOPHILS # BLD AUTO: 0.04 K/UL
BASOPHILS NFR BLD AUTO: 0.7 %
BILIRUB SERPL-MCNC: 0.3 MG/DL
BUN SERPL-MCNC: 14 MG/DL
CALCIUM SERPL-MCNC: 10.1 MG/DL
CHLORIDE SERPL-SCNC: 102 MMOL/L
CO2 SERPL-SCNC: 27 MMOL/L
CREAT SERPL-MCNC: 0.97 MG/DL
EGFR: 66 ML/MIN/1.73M2
EOSINOPHIL # BLD AUTO: 0.22 K/UL
EOSINOPHIL NFR BLD AUTO: 3.7 %
GLUCOSE SERPL-MCNC: 78 MG/DL
HCT VFR BLD CALC: 36.3 %
HGB BLD-MCNC: 11.9 G/DL
IMM GRANULOCYTES NFR BLD AUTO: 0.2 %
LYMPHOCYTES # BLD AUTO: 1.41 K/UL
LYMPHOCYTES NFR BLD AUTO: 23.6 %
MAN DIFF?: NORMAL
MCHC RBC-ENTMCNC: 30.3 PG
MCHC RBC-ENTMCNC: 32.8 GM/DL
MCV RBC AUTO: 92.4 FL
MONOCYTES # BLD AUTO: 0.47 K/UL
MONOCYTES NFR BLD AUTO: 7.9 %
NEUTROPHILS # BLD AUTO: 3.83 K/UL
NEUTROPHILS NFR BLD AUTO: 63.9 %
PLATELET # BLD AUTO: 217 K/UL
POTASSIUM SERPL-SCNC: 4.5 MMOL/L
PROT SERPL-MCNC: 7.3 G/DL
RBC # BLD: 3.93 M/UL
RBC # FLD: 12.6 %
SODIUM SERPL-SCNC: 139 MMOL/L
WBC # FLD AUTO: 5.98 K/UL

## 2023-12-18 ENCOUNTER — APPOINTMENT (OUTPATIENT)
Dept: PULMONOLOGY | Facility: CLINIC | Age: 63
End: 2023-12-18
Payer: MEDICAID

## 2023-12-18 ENCOUNTER — APPOINTMENT (OUTPATIENT)
Dept: DERMATOLOGY | Facility: CLINIC | Age: 63
End: 2023-12-18

## 2023-12-18 VITALS
SYSTOLIC BLOOD PRESSURE: 126 MMHG | DIASTOLIC BLOOD PRESSURE: 76 MMHG | WEIGHT: 111 LBS | HEART RATE: 65 BPM | TEMPERATURE: 98.1 F | BODY MASS INDEX: 18.49 KG/M2 | OXYGEN SATURATION: 98 % | HEIGHT: 65 IN | RESPIRATION RATE: 12 BRPM

## 2023-12-18 DIAGNOSIS — R09.81 NASAL CONGESTION: ICD-10-CM

## 2023-12-18 DIAGNOSIS — G71.00 MUSCULAR DYSTROPHY, UNSPECIFIED: ICD-10-CM

## 2023-12-18 DIAGNOSIS — G47.19 OTHER HYPERSOMNIA: ICD-10-CM

## 2023-12-18 DIAGNOSIS — J32.9 CHRONIC SINUSITIS, UNSPECIFIED: ICD-10-CM

## 2023-12-18 DIAGNOSIS — R06.02 SHORTNESS OF BREATH: ICD-10-CM

## 2023-12-18 PROCEDURE — G0008: CPT

## 2023-12-18 PROCEDURE — 99203 OFFICE O/P NEW LOW 30 MIN: CPT | Mod: 25

## 2023-12-18 PROCEDURE — 90662 IIV NO PRSV INCREASED AG IM: CPT

## 2023-12-18 RX ORDER — INFLUENZA A VIRUS A/VICTORIA/4897/2022 IVR-238 (H1N1) ANTIGEN (FORMALDEHYDE INACTIVATED), INFLUENZA A VIRUS A/DARWIN/9/2021 SAN-010 (H3N2) ANTIGEN (FORMALDEHYDE INACTIVATED), INFLUENZA B VIRUS B/PHUKET/3073/2013 ANTIGEN (FORMALDEHYDE INACTIVATED), AND INFLUENZA B VIRUS B/MICHIGAN/01/2021 ANTIGEN (FORMALDEHYDE INACTIVATED) 60; 60; 60; 60 UG/.7ML; UG/.7ML; UG/.7ML; UG/.7ML
0.7 INJECTION, SUSPENSION INTRAMUSCULAR
Qty: 1 | Refills: 0 | Status: DISCONTINUED | COMMUNITY
Start: 2023-12-18 | End: 2023-12-18

## 2023-12-19 PROBLEM — G71.00 MUSCULAR DYSTROPHY, UNSPECIFIED: Status: ACTIVE | Noted: 2023-12-19

## 2023-12-19 NOTE — PHYSICAL EXAM
[No Acute Distress] : no acute distress [Normal Oropharynx] : normal oropharynx [I] : Mallampati Class: I [Normal Appearance] : normal appearance [No Neck Mass] : no neck mass [Normal Rate/Rhythm] : normal rate/rhythm [Normal S1, S2] : normal s1, s2 [No Murmurs] : no murmurs [No Resp Distress] : no resp distress [Clear to Auscultation Bilaterally] : clear to auscultation bilaterally [No Abnormalities] : no abnormalities [Benign] : benign [No Clubbing] : no clubbing [No Edema] : no edema [Normal Color/ Pigmentation] : normal color/ pigmentation [Oriented x3] : oriented x3 [Normal Affect] : normal affect [Well Groomed] : well groomed [Supple] : supple [No Cyanosis] : no cyanosis [TextBox_11] : edematous nasal turbinates [TextBox_99] : gait assisted with LE braces

## 2023-12-19 NOTE — HISTORY OF PRESENT ILLNESS
[Awakes Unrefreshed] : awakes unrefreshed [Awakes with Headache] : awakes with headache [Difficulty Maintaining Sleep] : difficulty maintaining sleep [Frequent Nocturnal Awakening] : frequent nocturnal awakening [Never] : never [TextBox_4] : 63 yof with new diagnosis of tibial muscular dystrophy, anxiety and allopecia who is referred for evaluation of possible pulmonary involvement in setting of muscular dystrophy.  Diagnosed in 2021,her manifestations appear to be solely distal lower extremities without involvement of other areas. He exertional limiting factors are her lower extremities, and she does not report any dyspnea per se. Reports that it is occasionally difficult to take a deep breath, but otherwise no cough, wheezing or chest pain.  ROS also notable for reflux, daytime fatigue, AM headache and rhinosinusitis. Had exposure to particulate matter from [facility] which she believes accounts for her upper respiratory symptoms.  Had HST done recently to eval MARISELA but results were not discussed yet.  She is a never smoker, no allergies, no known exposures. No personal or family history of parenchymal lung abnormalities. 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  [Daytime Somnolence] : denies daytime somnolence [TextBox_100] : 10/10/2023 [TextBox_108] : 0.6-1.5 [TextBox_112] : 0.1min [TextBox_116] : 85

## 2023-12-19 NOTE — END OF VISIT
[] : Fellow [FreeTextEntry3] : I, personally performed the evaluation and management (E/M) services for this new patient.  That E/M includes conducting the initial examination, assessing all conditions, and establishing the plan of care.  Today, my ACP was here to observe my evaluation and management services for this patient to be followed going forward.     [Time Spent: ___ minutes] : I have spent [unfilled] minutes of time on the encounter.

## 2023-12-19 NOTE — REVIEW OF SYSTEMS
[Fatigue] : fatigue [Sinus Problems] : sinus problems [Chest Discomfort] : chest discomfort [GERD] : gerd [Negative] : Dermatologic [Focal Weakness] : focal weakness [Fever] : no fever [Chills] : no chills [Cough] : no cough [Sputum] : no sputum [Dyspnea] : no dyspnea [Wheezing] : no wheezing [SOB on Exertion] : no sob on exertion [Edema] : no edema [Palpitations] : no palpitations [Abdominal Pain] : no abdominal pain [Arthralgias] : no arthralgias [Myalgias] : no myalgias [Raynaud] : no raynaud [Rash] : no rash [TextBox_94] : chronic LE weakness [TextBox_122] : recurrent falls

## 2023-12-19 NOTE — ASSESSMENT
[FreeTextEntry1] : 63 yof with new diagnosis of tibial muscular dystrophy, anxiety and allopecia who is referred for evaluation of possible pulmonary involvement in setting of muscular dystrophy.  Her HST revealed no obstructive events, however in mild MARISELA HST is not very sensitive test and in cases with high clinical suspicion for MARISELA or hypoventilation PSG is recommended. Will order PSG with transcutaneous CO2 monitoring to asses presence of potential diaphragmatic involvement and hypoventilation related with her neuromuscular disease.   PFTs ordered with supine and sitting VC to assess for any restrictive lung disease as a manifestation of her neuromuscular disease/weakness.   Flu vaccine given today.   RTC after above tests to discuss results and next steps.

## 2023-12-26 ENCOUNTER — APPOINTMENT (OUTPATIENT)
Dept: SLEEP CENTER | Facility: HOSPITAL | Age: 63
End: 2023-12-26
Payer: MEDICAID

## 2023-12-26 ENCOUNTER — OUTPATIENT (OUTPATIENT)
Dept: OUTPATIENT SERVICES | Facility: HOSPITAL | Age: 63
LOS: 1 days | End: 2023-12-26
Payer: COMMERCIAL

## 2023-12-26 DIAGNOSIS — G47.33 OBSTRUCTIVE SLEEP APNEA (ADULT) (PEDIATRIC): ICD-10-CM

## 2023-12-26 PROCEDURE — 95810 POLYSOM 6/> YRS 4/> PARAM: CPT

## 2023-12-26 PROCEDURE — XXXXX: CPT | Mod: 1L

## 2024-01-10 ENCOUNTER — APPOINTMENT (OUTPATIENT)
Dept: PULMONOLOGY | Facility: CLINIC | Age: 64
End: 2024-01-10

## 2024-01-22 ENCOUNTER — TRANSCRIPTION ENCOUNTER (OUTPATIENT)
Age: 64
End: 2024-01-22

## 2024-01-23 ENCOUNTER — TRANSCRIPTION ENCOUNTER (OUTPATIENT)
Age: 64
End: 2024-01-23

## 2024-01-24 ENCOUNTER — OUTPATIENT (OUTPATIENT)
Dept: OUTPATIENT SERVICES | Facility: HOSPITAL | Age: 64
LOS: 1 days | End: 2024-01-24
Payer: COMMERCIAL

## 2024-01-24 DIAGNOSIS — R06.02 SHORTNESS OF BREATH: ICD-10-CM

## 2024-01-24 PROCEDURE — 94060 EVALUATION OF WHEEZING: CPT

## 2024-01-24 PROCEDURE — 94726 PLETHYSMOGRAPHY LUNG VOLUMES: CPT | Mod: 26

## 2024-01-24 PROCEDURE — 94729 DIFFUSING CAPACITY: CPT | Mod: 26

## 2024-01-24 PROCEDURE — 94726 PLETHYSMOGRAPHY LUNG VOLUMES: CPT

## 2024-01-24 PROCEDURE — 94760 N-INVAS EAR/PLS OXIMETRY 1: CPT

## 2024-01-24 PROCEDURE — 94729 DIFFUSING CAPACITY: CPT

## 2024-01-24 PROCEDURE — 94010 BREATHING CAPACITY TEST: CPT | Mod: 26

## 2024-01-24 NOTE — OCCUPATIONAL THERAPY INITIAL EVALUATION ADULT - HEALTH SCREEN CRITERIA
Can get responses off [employer]      General Call    Contacts         Type Contact Phone/Fax    01/24/2024 07:21 AM CST Phone (Incoming) MARK Diaz (Self) [phone] (H)          Reason for Call: Question about knee replacement    What are your questions or concerns:  Has metal allergy and would like to be tested for the metals and see if she can tolerate them or not before getting the knee replaced. She would like to discuss this with Gian. Would like a call back/message in .     Date of last appointment with provider: 10/30/2023    Could we send this information to you in [employer] or would you prefer to receive a phone call?:   Patient would like to be contacted via [employer]   yes

## 2024-02-01 ENCOUNTER — APPOINTMENT (OUTPATIENT)
Dept: PULMONOLOGY | Facility: CLINIC | Age: 64
End: 2024-02-01
Payer: MEDICAID

## 2024-02-01 DIAGNOSIS — R06.89 OTHER ABNORMALITIES OF BREATHING: ICD-10-CM

## 2024-02-01 PROCEDURE — 99442: CPT

## 2024-02-02 PROBLEM — R06.89 HYPOVENTILATION: Status: ACTIVE | Noted: 2024-02-02

## 2024-02-28 ENCOUNTER — RX RENEWAL (OUTPATIENT)
Age: 64
End: 2024-02-28

## 2024-02-28 RX ORDER — SPIRONOLACTONE 100 MG/1
100 TABLET ORAL
Qty: 90 | Refills: 1 | Status: ACTIVE | COMMUNITY
Start: 2021-08-17 | End: 1900-01-01

## 2024-03-04 ENCOUNTER — RX RENEWAL (OUTPATIENT)
Age: 64
End: 2024-03-04

## 2024-03-04 RX ORDER — SPIRONOLACTONE 50 MG/1
50 TABLET ORAL
Qty: 90 | Refills: 1 | Status: ACTIVE | COMMUNITY
Start: 2021-08-17 | End: 1900-01-01

## 2024-03-04 RX ORDER — CITALOPRAM HYDROBROMIDE 20 MG/1
20 TABLET, FILM COATED ORAL DAILY
Qty: 90 | Refills: 1 | Status: ACTIVE | COMMUNITY
Start: 2019-02-08 | End: 1900-01-01

## 2024-03-07 ENCOUNTER — APPOINTMENT (OUTPATIENT)
Dept: SLEEP CENTER | Facility: HOSPITAL | Age: 64
End: 2024-03-07

## 2024-03-07 ENCOUNTER — OUTPATIENT (OUTPATIENT)
Dept: OUTPATIENT SERVICES | Facility: HOSPITAL | Age: 64
LOS: 1 days | End: 2024-03-07
Payer: COMMERCIAL

## 2024-03-07 DIAGNOSIS — G47.33 OBSTRUCTIVE SLEEP APNEA (ADULT) (PEDIATRIC): ICD-10-CM

## 2024-03-07 PROCEDURE — 95811 POLYSOM 6/>YRS CPAP 4/> PARM: CPT | Mod: 26

## 2024-03-07 PROCEDURE — 95811 POLYSOM 6/>YRS CPAP 4/> PARM: CPT

## 2024-03-29 ENCOUNTER — APPOINTMENT (OUTPATIENT)
Dept: NEUROLOGY | Facility: CLINIC | Age: 64
End: 2024-03-29
Payer: MEDICAID

## 2024-03-29 DIAGNOSIS — M21.372 FOOT DROP, RIGHT FOOT: ICD-10-CM

## 2024-03-29 DIAGNOSIS — G71.09 OTHER SPECIFIED MUSCULAR DYSTROPHIES: ICD-10-CM

## 2024-03-29 DIAGNOSIS — M21.371 FOOT DROP, RIGHT FOOT: ICD-10-CM

## 2024-03-29 PROCEDURE — 99204 OFFICE O/P NEW MOD 45 MIN: CPT

## 2024-03-31 PROBLEM — M21.371 BILATERAL FOOT-DROP: Status: ACTIVE | Noted: 2020-07-30

## 2024-03-31 PROBLEM — G71.09: Status: ACTIVE | Noted: 2020-08-17

## 2024-03-31 NOTE — HISTORY OF PRESENT ILLNESS
[FreeTextEntry1] : Since last visit, weakness in the feet has been getting worse. She is having new braces fitted.  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

## 2024-03-31 NOTE — ASSESSMENT
[FreeTextEntry1] : Progressive weakness in lower legs due to tibial muscular dystrophy (titin mutation)  Dyspnea / neuromuscular respiratory weakness not typically a prominent feature but seems like she does have some f/u with pulm, BIPAP Will get new braces f/u with me in 6 months

## 2024-03-31 NOTE — PHYSICAL EXAM
[FreeTextEntry1] : Atrophy tibialis anterior b/l Ankle dorsiflexion and eversion 3-/5, EHL and EDB 4/5, toe flexion 4+/5, foot plantar flexion 4 Reflexes 2+ symmetric

## 2024-04-04 ENCOUNTER — APPOINTMENT (OUTPATIENT)
Dept: PULMONOLOGY | Facility: CLINIC | Age: 64
End: 2024-04-04
Payer: MEDICAID

## 2024-04-04 PROCEDURE — 99442: CPT

## 2024-05-07 ENCOUNTER — RX RENEWAL (OUTPATIENT)
Age: 64
End: 2024-05-07

## 2024-05-07 ENCOUNTER — TRANSCRIPTION ENCOUNTER (OUTPATIENT)
Age: 64
End: 2024-05-07

## 2024-05-07 RX ORDER — MINOXIDIL 2.5 MG/1
2.5 TABLET ORAL DAILY
Qty: 90 | Refills: 1 | Status: ACTIVE | COMMUNITY
Start: 2023-08-07 | End: 1900-01-01

## 2024-05-08 ENCOUNTER — TRANSCRIPTION ENCOUNTER (OUTPATIENT)
Age: 64
End: 2024-05-08

## 2024-06-05 ENCOUNTER — APPOINTMENT (OUTPATIENT)
Dept: HEART AND VASCULAR | Facility: CLINIC | Age: 64
End: 2024-06-05

## 2024-06-19 ENCOUNTER — TRANSCRIPTION ENCOUNTER (OUTPATIENT)
Age: 64
End: 2024-06-19

## 2024-07-10 ENCOUNTER — TRANSCRIPTION ENCOUNTER (OUTPATIENT)
Age: 64
End: 2024-07-10

## 2024-07-11 ENCOUNTER — APPOINTMENT (OUTPATIENT)
Dept: DERMATOLOGY | Facility: CLINIC | Age: 64
End: 2024-07-11
Payer: MEDICAID

## 2024-07-11 ENCOUNTER — APPOINTMENT (OUTPATIENT)
Dept: DERMATOLOGY | Facility: CLINIC | Age: 64
End: 2024-07-11
Payer: SELF-PAY

## 2024-07-11 DIAGNOSIS — L30.9 DERMATITIS, UNSPECIFIED: ICD-10-CM

## 2024-07-11 DIAGNOSIS — R23.8 OTHER SKIN CHANGES: ICD-10-CM

## 2024-07-11 DIAGNOSIS — L72.3 SEBACEOUS CYST: ICD-10-CM

## 2024-07-11 DIAGNOSIS — L64.9 ANDROGENIC ALOPECIA, UNSPECIFIED: ICD-10-CM

## 2024-07-11 DIAGNOSIS — L82.1 OTHER SEBORRHEIC KERATOSIS: ICD-10-CM

## 2024-07-11 PROCEDURE — 99214 OFFICE O/P EST MOD 30 MIN: CPT | Mod: 25

## 2024-07-11 PROCEDURE — D0123: CPT

## 2024-07-11 PROCEDURE — 11900 INJECT SKIN LESIONS </W 7: CPT

## 2024-07-11 RX ORDER — TRETINOIN 0.5 MG/G
0.05 CREAM TOPICAL
Qty: 1 | Refills: 5 | Status: ACTIVE | COMMUNITY
Start: 2024-07-11 | End: 1900-01-01

## 2024-07-15 ENCOUNTER — TRANSCRIPTION ENCOUNTER (OUTPATIENT)
Age: 64
End: 2024-07-15

## 2024-07-16 ENCOUNTER — TRANSCRIPTION ENCOUNTER (OUTPATIENT)
Age: 64
End: 2024-07-16

## 2024-07-17 ENCOUNTER — APPOINTMENT (OUTPATIENT)
Dept: INTERNAL MEDICINE | Facility: CLINIC | Age: 64
End: 2024-07-17
Payer: MEDICAID

## 2024-07-17 VITALS
OXYGEN SATURATION: 99 % | DIASTOLIC BLOOD PRESSURE: 73 MMHG | SYSTOLIC BLOOD PRESSURE: 110 MMHG | TEMPERATURE: 97.9 F | BODY MASS INDEX: 17.83 KG/M2 | HEART RATE: 64 BPM | HEIGHT: 65 IN | WEIGHT: 107 LBS

## 2024-07-17 DIAGNOSIS — Z23 ENCOUNTER FOR IMMUNIZATION: ICD-10-CM

## 2024-07-17 DIAGNOSIS — Z00.00 ENCOUNTER FOR GENERAL ADULT MEDICAL EXAMINATION W/OUT ABNORMAL FINDINGS: ICD-10-CM

## 2024-07-17 DIAGNOSIS — E78.5 HYPERLIPIDEMIA, UNSPECIFIED: ICD-10-CM

## 2024-07-17 DIAGNOSIS — R06.89 OTHER ABNORMALITIES OF BREATHING: ICD-10-CM

## 2024-07-17 DIAGNOSIS — G71.00 MUSCULAR DYSTROPHY, UNSPECIFIED: ICD-10-CM

## 2024-07-17 DIAGNOSIS — M85.80 OTHER SPECIFIED DISORDERS OF BONE DENSITY AND STRUCTURE, UNSPECIFIED SITE: ICD-10-CM

## 2024-07-17 PROCEDURE — 99396 PREV VISIT EST AGE 40-64: CPT | Mod: 25

## 2024-07-17 PROCEDURE — 90677 PCV20 VACCINE IM: CPT

## 2024-07-17 PROCEDURE — G0009: CPT

## 2024-07-18 ENCOUNTER — TRANSCRIPTION ENCOUNTER (OUTPATIENT)
Age: 64
End: 2024-07-18

## 2024-07-23 ENCOUNTER — TRANSCRIPTION ENCOUNTER (OUTPATIENT)
Age: 64
End: 2024-07-23

## 2024-07-23 LAB
25(OH)D3 SERPL-MCNC: 21.1 NG/ML
ALBUMIN SERPL ELPH-MCNC: 4.9 G/DL
ALP BLD-CCNC: 71 U/L
ALT SERPL-CCNC: 16 U/L
ANION GAP SERPL CALC-SCNC: 10 MMOL/L
APPEARANCE: CLEAR
AST SERPL-CCNC: 28 U/L
BACTERIA: NEGATIVE /HPF
BASOPHILS # BLD AUTO: 0.05 K/UL
BASOPHILS NFR BLD AUTO: 0.8 %
BILIRUB SERPL-MCNC: 0.3 MG/DL
BILIRUBIN URINE: NEGATIVE
BLOOD URINE: NEGATIVE
BUN SERPL-MCNC: 13 MG/DL
CALCIUM SERPL-MCNC: 10.1 MG/DL
CAST: 0 /LPF
CHLORIDE SERPL-SCNC: 101 MMOL/L
CHOLEST SERPL-MCNC: 206 MG/DL
CO2 SERPL-SCNC: 27 MMOL/L
COLOR: YELLOW
CREAT SERPL-MCNC: 1.02 MG/DL
EGFR: 62 ML/MIN/1.73M2
EOSINOPHIL # BLD AUTO: 0.45 K/UL
EOSINOPHIL NFR BLD AUTO: 7.6 %
EPITHELIAL CELLS: 0 /HPF
ESTIMATED AVERAGE GLUCOSE: 120 MG/DL
FOLATE SERPL-MCNC: 10.4 NG/ML
GLUCOSE QUALITATIVE U: NEGATIVE MG/DL
GLUCOSE SERPL-MCNC: 89 MG/DL
HBA1C MFR BLD HPLC: 5.8 %
HCT VFR BLD CALC: 39 %
HDLC SERPL-MCNC: 81 MG/DL
HGB BLD-MCNC: 12.1 G/DL
IMM GRANULOCYTES NFR BLD AUTO: 0.5 %
KETONES URINE: NEGATIVE MG/DL
LDLC SERPL CALC-MCNC: 106 MG/DL
LEUKOCYTE ESTERASE URINE: ABNORMAL
LYMPHOCYTES # BLD AUTO: 1.71 K/UL
LYMPHOCYTES NFR BLD AUTO: 28.7 %
MAGNESIUM SERPL-MCNC: 2 MG/DL
MAN DIFF?: NORMAL
MCHC RBC-ENTMCNC: 29.1 PG
MCHC RBC-ENTMCNC: 31 GM/DL
MCV RBC AUTO: 93.8 FL
MICROSCOPIC-UA: NORMAL
MONOCYTES # BLD AUTO: 0.54 K/UL
MONOCYTES NFR BLD AUTO: 9.1 %
NEUTROPHILS # BLD AUTO: 3.17 K/UL
NEUTROPHILS NFR BLD AUTO: 53.3 %
NITRITE URINE: NEGATIVE
NONHDLC SERPL-MCNC: 125 MG/DL
PH URINE: 6.5
PLATELET # BLD AUTO: 231 K/UL
POTASSIUM SERPL-SCNC: 4.6 MMOL/L
PROT SERPL-MCNC: 7.6 G/DL
PROTEIN URINE: NEGATIVE MG/DL
RBC # BLD: 4.16 M/UL
RBC # FLD: 13.3 %
RED BLOOD CELLS URINE: 2 /HPF
SODIUM SERPL-SCNC: 139 MMOL/L
SPECIFIC GRAVITY URINE: 1.01
TRIGL SERPL-MCNC: 106 MG/DL
TSH SERPL-ACNC: 1.23 UIU/ML
UROBILINOGEN URINE: 0.2 MG/DL
VIT B12 SERPL-MCNC: 501 PG/ML
WBC # FLD AUTO: 5.95 K/UL
WHITE BLOOD CELLS URINE: 0 /HPF

## 2024-07-24 ENCOUNTER — TRANSCRIPTION ENCOUNTER (OUTPATIENT)
Age: 64
End: 2024-07-24

## 2024-07-25 ENCOUNTER — APPOINTMENT (OUTPATIENT)
Dept: PULMONOLOGY | Facility: CLINIC | Age: 64
End: 2024-07-25

## 2024-07-25 PROCEDURE — 99442: CPT | Mod: 93

## 2024-08-06 ENCOUNTER — TRANSCRIPTION ENCOUNTER (OUTPATIENT)
Age: 64
End: 2024-08-06

## 2024-08-07 ENCOUNTER — APPOINTMENT (OUTPATIENT)
Dept: RADIOLOGY | Facility: CLINIC | Age: 64
End: 2024-08-07

## 2024-08-07 ENCOUNTER — OUTPATIENT (OUTPATIENT)
Dept: OUTPATIENT SERVICES | Facility: HOSPITAL | Age: 64
LOS: 1 days | End: 2024-08-07

## 2024-08-07 PROCEDURE — 77085 DXA BONE DENSITY AXL VRT FX: CPT | Mod: 26

## 2024-08-14 ENCOUNTER — TRANSCRIPTION ENCOUNTER (OUTPATIENT)
Age: 64
End: 2024-08-14

## 2024-08-21 ENCOUNTER — TRANSCRIPTION ENCOUNTER (OUTPATIENT)
Age: 64
End: 2024-08-21

## 2024-08-22 ENCOUNTER — APPOINTMENT (OUTPATIENT)
Dept: OTOLARYNGOLOGY | Facility: CLINIC | Age: 64
End: 2024-08-22

## 2024-08-27 ENCOUNTER — TRANSCRIPTION ENCOUNTER (OUTPATIENT)
Age: 64
End: 2024-08-27

## 2024-08-29 ENCOUNTER — APPOINTMENT (OUTPATIENT)
Dept: OTOLARYNGOLOGY | Facility: CLINIC | Age: 64
End: 2024-08-29

## 2024-08-29 DIAGNOSIS — J34.2 DEVIATED NASAL SEPTUM: ICD-10-CM

## 2024-08-29 DIAGNOSIS — J34.89 OTHER SPECIFIED DISORDERS OF NOSE AND NASAL SINUSES: ICD-10-CM

## 2024-08-29 DIAGNOSIS — L73.9 FOLLICULAR DISORDER, UNSPECIFIED: ICD-10-CM

## 2024-08-29 PROCEDURE — 31231 NASAL ENDOSCOPY DX: CPT

## 2024-08-29 PROCEDURE — 99214 OFFICE O/P EST MOD 30 MIN: CPT | Mod: 25

## 2024-08-29 NOTE — HISTORY OF PRESENT ILLNESS
[de-identified] : CC: hearing loss  HISTORY OF PRESENT ILLNESS: Ms. 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 they come and go and resolve spontaneously. they usually do not bleed and looks like a crack with redness causing pain she has put carmex lip balm on it which has helped. during some episodes the external part of her nose gets red and inflamed originally she saw derm who sent her to ENT denies fevers  REVIEW OF SYSTEMS:  General ROS: negative for - chills, fatigue or fever Psychological ROS: negative for - anxiety or depression Ophthalmic ROS: negative for - blurry vision, decreased vision or double vision  ENT ROS: negative except as noted from HPI Allergy and Immunology ROS: negative except as noted from HPI Hematological and Lymphatic ROS: negative for - bleeding problems  Endocrine ROS: negative for - malaise/lethargy Respiratory ROS: negative for - stridor Cardiovascular ROS: negative for - chest pain Gastrointestinal ROS: negative for - appetite loss or nausea/vomiting Genitourinary ROS: negative for - incontinence Musculoskeletal ROS: negative for - gait disturbance  Neurological ROS: negative for - behavioral changes Dermatological ROS: negative for - nail changes  Physical Exam:  GENERAL APPEARANCE: Well-developed and No Acute Distress. COMMUNICATION: Able to Communicate. Strong Voice.  HEAD AND FACE Eyes: Testing of ocular motility including primary gaze alignment normal. Inspection and Appearance: No evidence of lesions or masses Palpation: Palpation of the face reveals no sinus tenderness Salivary Glands: Symmetric without masses Facial Strength: Symmetric without evidence of facial paralysis  EAR, NOSE, MOUTH, and THROAT: Ear Canals and Tympanic Membranes, Bilateral: No evidence of inflammation or lesions. Thresholds: Clinical speech reception thresholds normal. External, Nose and Auricle: No lesions or masses.  NECK: Evaluation: No evidence of masses or crepitus. The neck is symmetric and the trachea is in the midline position. Thyroid: No evidence of enlargement, tenderness or mass. Neck Lymph Nodes: WNL. Respiratory: Inspection of the chest including symmetry, expansion and/or assessment of respiratory effort normal. Cardiovascular: Evaluation of peripheral vascular system by observation and palpation of capillary refill, normal. Neurological/Psychiatric: Alert, Oriented, Mood, and Affect Normal.  PROCEDURE: Nasal endoscopy (80211)   SURGEON: Chan Ramirez MD   Prior to the procedure, I had a discussion with the patient regarding the risks, benefits, and alternatives of the procedure and a verbal consent was obtained.   After obtaining adequate decongestion of the nasal mucosa with topical Epinephrine and adequate anesthesia with topical Lidocaine nasal spray, the nasal endoscope was used to examine the nasal passages and paranasal sinuses. The endoscope was passed along the floor of the nose bilaterally to evaluate the inferior meatus, floor of the nose, inferior turbinate, and nasopharynx. The scope was then passed superiorly to evaluate the area of the sphenoethmoidal recess, superior turbinate and superior meatus. As the scope was withdrawn anteriorly, the middle turbinate and middle meatus were carefully inspected. The endoscope was withdrawn and the patient tolerated the procedure well. No complications were encountered.   INSTRUMENTS: rigid 45   EXAM FINDINGS:    IMPRESSION: Ms. Nazario is a pleasant 63 year old lady with R nostril folliculitis, DNS  PLAN: - prescribed mupirocin ointment for R nostril - prescribed flonase for DNS and rhinorrhea - RTC PRN  Chan Ramirez MD [facility] Rhinology and Skull Base Surgery Department of Otolaryngology- Head and Neck Surgery [facility]

## 2024-08-29 NOTE — HISTORY OF PRESENT ILLNESS
[de-identified] : CC: hearing loss  HISTORY OF PRESENT ILLNESS: Ms. 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 they come and go and resolve spontaneously. they usually do not bleed and looks like a crack with redness causing pain she has put carmex lip balm on it which has helped. during some episodes the external part of her nose gets red and inflamed originally she saw derm who sent her to ENT denies fevers  REVIEW OF SYSTEMS:  General ROS: negative for - chills, fatigue or fever Psychological ROS: negative for - anxiety or depression Ophthalmic ROS: negative for - blurry vision, decreased vision or double vision  ENT ROS: negative except as noted from HPI Allergy and Immunology ROS: negative except as noted from HPI Hematological and Lymphatic ROS: negative for - bleeding problems  Endocrine ROS: negative for - malaise/lethargy Respiratory ROS: negative for - stridor Cardiovascular ROS: negative for - chest pain Gastrointestinal ROS: negative for - appetite loss or nausea/vomiting Genitourinary ROS: negative for - incontinence Musculoskeletal ROS: negative for - gait disturbance  Neurological ROS: negative for - behavioral changes Dermatological ROS: negative for - nail changes  Physical Exam:  GENERAL APPEARANCE: Well-developed and No Acute Distress. COMMUNICATION: Able to Communicate. Strong Voice.  HEAD AND FACE Eyes: Testing of ocular motility including primary gaze alignment normal. Inspection and Appearance: No evidence of lesions or masses Palpation: Palpation of the face reveals no sinus tenderness Salivary Glands: Symmetric without masses Facial Strength: Symmetric without evidence of facial paralysis  EAR, NOSE, MOUTH, and THROAT: Ear Canals and Tympanic Membranes, Bilateral: No evidence of inflammation or lesions. Thresholds: Clinical speech reception thresholds normal. External, Nose and Auricle: No lesions or masses.  NECK: Evaluation: No evidence of masses or crepitus. The neck is symmetric and the trachea is in the midline position. Thyroid: No evidence of enlargement, tenderness or mass. Neck Lymph Nodes: WNL. Respiratory: Inspection of the chest including symmetry, expansion and/or assessment of respiratory effort normal. Cardiovascular: Evaluation of peripheral vascular system by observation and palpation of capillary refill, normal. Neurological/Psychiatric: Alert, Oriented, Mood, and Affect Normal.  PROCEDURE: Nasal endoscopy (56702)   SURGEON: Chan Ramirez MD   Prior to the procedure, I had a discussion with the patient regarding the risks, benefits, and alternatives of the procedure and a verbal consent was obtained.   After obtaining adequate decongestion of the nasal mucosa with topical Epinephrine and adequate anesthesia with topical Lidocaine nasal spray, the nasal endoscope was used to examine the nasal passages and paranasal sinuses. The endoscope was passed along the floor of the nose bilaterally to evaluate the inferior meatus, floor of the nose, inferior turbinate, and nasopharynx. The scope was then passed superiorly to evaluate the area of the sphenoethmoidal recess, superior turbinate and superior meatus. As the scope was withdrawn anteriorly, the middle turbinate and middle meatus were carefully inspected. The endoscope was withdrawn and the patient tolerated the procedure well. No complications were encountered.   INSTRUMENTS: rigid 45   EXAM FINDINGS:    IMPRESSION: Ms. Nazario is a pleasant 63 year old lady with R nostril folliculitis, DNS  PLAN: - prescribed mupirocin ointment for R nostril - prescribed flonase for DNS and rhinorrhea - RTC PRN  Chan Ramirez MD [facility] Rhinology and Skull Base Surgery Department of Otolaryngology- Head and Neck Surgery [facility]

## 2024-09-06 RX ORDER — MUPIROCIN 20 MG/G
2 OINTMENT TOPICAL 3 TIMES DAILY
Qty: 1 | Refills: 0 | Status: ACTIVE | COMMUNITY
Start: 2024-08-29 | End: 1900-01-01

## 2024-09-06 RX ORDER — FLUTICASONE PROPIONATE 50 UG/1
50 SPRAY, METERED NASAL TWICE DAILY
Qty: 1 | Refills: 2 | Status: ACTIVE | COMMUNITY
Start: 2024-08-29 | End: 1900-01-01

## 2024-09-12 ENCOUNTER — TRANSCRIPTION ENCOUNTER (OUTPATIENT)
Age: 64
End: 2024-09-12

## 2024-09-12 ENCOUNTER — APPOINTMENT (OUTPATIENT)
Dept: DERMATOLOGY | Facility: CLINIC | Age: 64
End: 2024-09-12
Payer: MEDICAID

## 2024-09-12 ENCOUNTER — APPOINTMENT (OUTPATIENT)
Dept: DERMATOLOGY | Facility: CLINIC | Age: 64
End: 2024-09-12
Payer: SELF-PAY

## 2024-09-12 DIAGNOSIS — L81.0 POSTINFLAMMATORY HYPERPIGMENTATION: ICD-10-CM

## 2024-09-12 DIAGNOSIS — L82.1 OTHER SEBORRHEIC KERATOSIS: ICD-10-CM

## 2024-09-12 DIAGNOSIS — L72.3 SEBACEOUS CYST: ICD-10-CM

## 2024-09-12 PROCEDURE — 99213 OFFICE O/P EST LOW 20 MIN: CPT

## 2024-09-12 PROCEDURE — D0123: CPT

## 2024-09-12 RX ORDER — HYDROQUINONE 40 MG/G
4 CREAM TOPICAL
Qty: 1 | Refills: 0 | Status: ACTIVE | COMMUNITY
Start: 2024-09-12 | End: 1900-01-01

## 2024-09-13 NOTE — PHYSICAL EXAM
[Alert] : alert [Oriented x 3] : ~L oriented x 3 [Declined] : declined [FreeTextEntry3] : Focused exam: -skin colored papules on forehead -brown thin plaque on R cheek -small hyperpigmented macule with atrophy on L cheek

## 2024-09-13 NOTE — PHYSICAL EXAM
[Alert] : alert [Oriented x 3] : ~L oriented x 3 [Declined] : declined [FreeTextEntry3] : Focused exam: -stuck on brown thin plaque on R cheek

## 2024-09-13 NOTE — HISTORY OF PRESENT ILLNESS
[FreeTextEntry1] : cyst, SK [de-identified] : 63yo F presents for f/up, last seen 7/11/24 s/p ILK 2.5mg/cc to inflamed EIC on L cheek noticed dark spot and indentation afterwards SK on R cheek s/p electrodess at [facility], still some left also with bumps on forehead that are cosmetically bothersome

## 2024-09-13 NOTE — ASSESSMENT
[FreeTextEntry1] : #Seborrheic keratosis - R cheek Electrodessication Procedure Note   The indication, risks, benefits, and alternatives to this procedure were discussed in detail with the patient and all questions were answered. Side effects including, but not limited to, the following were discussed: pain, bleeding, erythema, scarring, hyper/hypopigmentation, recurrence. Informed consent was obtained verbally. The site was cleaned with alcohol and allowed to dry completely.   Electrodessication was performed on the lesion(s) at a power setting of 0.3 carreon. The patient tolerated the procedure well without complications. Dressed with petrolatum ointment and bandage. Post-operative care was discussed.   Cosmetic fee $100

## 2024-09-13 NOTE — ASSESSMENT
[FreeTextEntry1] : #Inflamed EIC - L cheek - s/p ILK #PIH/atrophy -reviewed tx options - will improve over time. Sun protection, lightening agent, saline injection vs. filler. Pt opts for saline injection today, reviewed temporary nature. For more permanent solution if without improvement, can consider filler. start hydroquinone 4% cream daily to affected area  #SK see separate procedure note  #Sebaceous hyperplasia -reviewed electrodessication, risk of scarring, hypo or hyperpigmentation, incomplete removal, recurrence.  RTC PRN

## 2024-09-13 NOTE — HISTORY OF PRESENT ILLNESS
[FreeTextEntry1] : skin lesion [de-identified] : 65yo F presents for follow up removal of SK on R cheek previously had SK electrodessication , tx # 2

## 2024-09-18 DIAGNOSIS — F43.20 ADJUSTMENT DISORDER, UNSPECIFIED: ICD-10-CM

## 2024-09-27 ENCOUNTER — APPOINTMENT (OUTPATIENT)
Dept: NEUROLOGY | Facility: CLINIC | Age: 64
End: 2024-09-27
Payer: MEDICAID

## 2024-09-27 VITALS
WEIGHT: 110 LBS | HEART RATE: 70 BPM | HEIGHT: 65 IN | BODY MASS INDEX: 18.33 KG/M2 | OXYGEN SATURATION: 98 % | TEMPERATURE: 97.2 F | SYSTOLIC BLOOD PRESSURE: 115 MMHG | DIASTOLIC BLOOD PRESSURE: 76 MMHG

## 2024-09-27 DIAGNOSIS — M21.371 FOOT DROP, RIGHT FOOT: ICD-10-CM

## 2024-09-27 DIAGNOSIS — G71.09 OTHER SPECIFIED MUSCULAR DYSTROPHIES: ICD-10-CM

## 2024-09-27 DIAGNOSIS — M21.372 FOOT DROP, RIGHT FOOT: ICD-10-CM

## 2024-09-27 PROCEDURE — 99214 OFFICE O/P EST MOD 30 MIN: CPT

## 2024-09-30 NOTE — ASSESSMENT
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 Marcano

## 2024-09-30 NOTE — HISTORY OF PRESENT ILLNESS
[FreeTextEntry1] : Weakness in the feet has progressed, she has ordered custom AFOs which should be ready soon.  She tried NIV at night but did not tolerate it so stopped; she's been doing kundalini yoga breathing exercises which has helped respiratory symptoms  Reviewed: Notes from internal medicine, pulmonology Labs below ABG - CO2 42

## 2024-09-30 NOTE — PHYSICAL EXAM
[FreeTextEntry1] : Atrophy tibialis anterior b/l Ankle dorsiflexion and eversion 3/5, EHL and EDB 4/5, toe flexion 4+/5, foot plantar flexion 4 Reflexes 2+ symmetric

## 2024-10-02 ENCOUNTER — TRANSCRIPTION ENCOUNTER (OUTPATIENT)
Age: 64
End: 2024-10-02

## 2024-12-02 ENCOUNTER — TRANSCRIPTION ENCOUNTER (OUTPATIENT)
Age: 64
End: 2024-12-02

## 2024-12-10 ENCOUNTER — APPOINTMENT (OUTPATIENT)
Dept: DERMATOLOGY | Facility: CLINIC | Age: 64
End: 2024-12-10
Payer: COMMERCIAL

## 2024-12-10 DIAGNOSIS — L82.1 OTHER SEBORRHEIC KERATOSIS: ICD-10-CM

## 2024-12-10 DIAGNOSIS — D22.9 MELANOCYTIC NEVI, UNSPECIFIED: ICD-10-CM

## 2024-12-10 DIAGNOSIS — Z12.83 ENCOUNTER FOR SCREENING FOR MALIGNANT NEOPLASM OF SKIN: ICD-10-CM

## 2024-12-10 PROCEDURE — 99213 OFFICE O/P EST LOW 20 MIN: CPT

## 2024-12-11 ENCOUNTER — TRANSCRIPTION ENCOUNTER (OUTPATIENT)
Age: 64
End: 2024-12-11

## 2025-01-28 ENCOUNTER — RESULT REVIEW (OUTPATIENT)
Age: 65
End: 2025-01-28

## 2025-01-28 ENCOUNTER — APPOINTMENT (OUTPATIENT)
Dept: ORTHOPEDIC SURGERY | Facility: CLINIC | Age: 65
End: 2025-01-28
Payer: COMMERCIAL

## 2025-01-28 ENCOUNTER — OUTPATIENT (OUTPATIENT)
Dept: OUTPATIENT SERVICES | Facility: HOSPITAL | Age: 65
LOS: 1 days | End: 2025-01-28
Payer: COMMERCIAL

## 2025-01-28 VITALS
HEIGHT: 65 IN | DIASTOLIC BLOOD PRESSURE: 75 MMHG | OXYGEN SATURATION: 97 % | WEIGHT: 110 LBS | SYSTOLIC BLOOD PRESSURE: 110 MMHG | BODY MASS INDEX: 18.33 KG/M2 | HEART RATE: 81 BPM

## 2025-01-28 DIAGNOSIS — Z96.642 AFTERCARE FOLLOWING JOINT REPLACEMENT SURGERY: ICD-10-CM

## 2025-01-28 DIAGNOSIS — M16.12 UNILATERAL PRIMARY OSTEOARTHRITIS, LEFT HIP: ICD-10-CM

## 2025-01-28 DIAGNOSIS — M16.11 UNILATERAL PRIMARY OSTEOARTHRITIS, RIGHT HIP: ICD-10-CM

## 2025-01-28 DIAGNOSIS — Z47.1 AFTERCARE FOLLOWING JOINT REPLACEMENT SURGERY: ICD-10-CM

## 2025-01-28 PROCEDURE — 73564 X-RAY EXAM KNEE 4 OR MORE: CPT

## 2025-01-28 PROCEDURE — 73521 X-RAY EXAM HIPS BI 2 VIEWS: CPT

## 2025-01-28 PROCEDURE — 99215 OFFICE O/P EST HI 40 MIN: CPT

## 2025-01-28 PROCEDURE — 72020 X-RAY EXAM OF SPINE 1 VIEW: CPT

## 2025-01-28 PROCEDURE — 73521 X-RAY EXAM HIPS BI 2 VIEWS: CPT | Mod: 26

## 2025-01-28 PROCEDURE — 73564 X-RAY EXAM KNEE 4 OR MORE: CPT | Mod: 26,LT,RT

## 2025-01-28 PROCEDURE — 72020 X-RAY EXAM OF SPINE 1 VIEW: CPT | Mod: 26

## 2025-01-28 RX ORDER — CELECOXIB 100 MG/1
100 CAPSULE ORAL TWICE DAILY
Qty: 60 | Refills: 2 | Status: ACTIVE | COMMUNITY
Start: 2025-01-28 | End: 1900-01-01

## 2025-03-19 ENCOUNTER — RX RENEWAL (OUTPATIENT)
Age: 65
End: 2025-03-19

## 2025-03-26 ENCOUNTER — TRANSCRIPTION ENCOUNTER (OUTPATIENT)
Age: 65
End: 2025-03-26

## 2025-04-01 ENCOUNTER — APPOINTMENT (OUTPATIENT)
Dept: NEUROLOGY | Age: 65
End: 2025-04-01
Payer: COMMERCIAL

## 2025-04-01 ENCOUNTER — NON-APPOINTMENT (OUTPATIENT)
Age: 65
End: 2025-04-01

## 2025-04-01 VITALS
SYSTOLIC BLOOD PRESSURE: 98 MMHG | BODY MASS INDEX: 18.66 KG/M2 | DIASTOLIC BLOOD PRESSURE: 62 MMHG | RESPIRATION RATE: 17 BRPM | OXYGEN SATURATION: 99 % | TEMPERATURE: 98 F | WEIGHT: 112 LBS | HEART RATE: 69 BPM | HEIGHT: 65 IN

## 2025-04-01 DIAGNOSIS — G71.09 OTHER SPECIFIED MUSCULAR DYSTROPHIES: ICD-10-CM

## 2025-04-01 DIAGNOSIS — M21.372 FOOT DROP, RIGHT FOOT: ICD-10-CM

## 2025-04-01 DIAGNOSIS — M21.371 FOOT DROP, RIGHT FOOT: ICD-10-CM

## 2025-04-01 DIAGNOSIS — G72.9 MYOPATHY, UNSPECIFIED: ICD-10-CM

## 2025-04-01 PROCEDURE — 99215 OFFICE O/P EST HI 40 MIN: CPT

## 2025-04-04 LAB
CK SERPL-CCNC: 152 U/L
CRP SERPL-MCNC: <3 MG/L
ERYTHROCYTE [SEDIMENTATION RATE] IN BLOOD BY WESTERGREN METHOD: 24 MM/HR
T4 FREE SERPL-MCNC: 1.2 NG/DL

## 2025-04-07 LAB
ALBUMIN MFR SERPL ELPH: 65.8 %
ALBUMIN SERPL-MCNC: 4.9 G/DL
ALBUMIN/GLOB SERPL: 2 RATIO
ALDOLASE SERPL-CCNC: 3.4 U/L
ALPHA1 GLOB MFR SERPL ELPH: 4.3 %
ALPHA1 GLOB SERPL ELPH-MCNC: 0.3 G/DL
ALPHA2 GLOB MFR SERPL ELPH: 8.1 %
ALPHA2 GLOB SERPL ELPH-MCNC: 0.6 G/DL
B-GLOBULIN MFR SERPL ELPH: 10 %
B-GLOBULIN SERPL ELPH-MCNC: 0.7 G/DL
GAMMA GLOB FLD ELPH-MCNC: 0.9 G/DL
GAMMA GLOB MFR SERPL ELPH: 11.8 %
INTERPRETATION SERPL IEP-IMP: NORMAL
M PROTEIN SPEC IFE-MCNC: NORMAL
PROT SERPL-MCNC: 7.4 G/DL
PROT SERPL-MCNC: 7.4 G/DL

## 2025-04-17 ENCOUNTER — APPOINTMENT (OUTPATIENT)
Dept: NEUROLOGY | Age: 65
End: 2025-04-17
Payer: COMMERCIAL

## 2025-04-17 VITALS
TEMPERATURE: 96.7 F | OXYGEN SATURATION: 98 % | SYSTOLIC BLOOD PRESSURE: 110 MMHG | HEIGHT: 65 IN | WEIGHT: 110 LBS | HEART RATE: 74 BPM | BODY MASS INDEX: 18.33 KG/M2 | DIASTOLIC BLOOD PRESSURE: 72 MMHG

## 2025-04-17 DIAGNOSIS — G71.09 OTHER SPECIFIED MUSCULAR DYSTROPHIES: ICD-10-CM

## 2025-04-17 PROCEDURE — 95886 MUSC TEST DONE W/N TEST COMP: CPT

## 2025-04-17 PROCEDURE — 99213 OFFICE O/P EST LOW 20 MIN: CPT | Mod: 25

## 2025-04-17 PROCEDURE — 95911 NRV CNDJ TEST 9-10 STUDIES: CPT

## 2025-06-06 ENCOUNTER — TRANSCRIPTION ENCOUNTER (OUTPATIENT)
Age: 65
End: 2025-06-06

## 2025-06-06 ENCOUNTER — NON-APPOINTMENT (OUTPATIENT)
Age: 65
End: 2025-06-06

## 2025-06-06 ENCOUNTER — APPOINTMENT (OUTPATIENT)
Dept: HEART AND VASCULAR | Facility: CLINIC | Age: 65
End: 2025-06-06
Payer: COMMERCIAL

## 2025-06-06 VITALS
OXYGEN SATURATION: 97 % | BODY MASS INDEX: 18.66 KG/M2 | HEART RATE: 67 BPM | TEMPERATURE: 98.5 F | HEIGHT: 65 IN | WEIGHT: 112 LBS | DIASTOLIC BLOOD PRESSURE: 65 MMHG | SYSTOLIC BLOOD PRESSURE: 106 MMHG

## 2025-06-06 PROCEDURE — 93000 ELECTROCARDIOGRAM COMPLETE: CPT | Mod: NC

## 2025-06-06 PROCEDURE — 99213 OFFICE O/P EST LOW 20 MIN: CPT | Mod: 25

## 2025-06-09 ENCOUNTER — TRANSCRIPTION ENCOUNTER (OUTPATIENT)
Age: 65
End: 2025-06-09

## 2025-06-11 RX ORDER — MELOXICAM 7.5 MG/1
7.5 TABLET ORAL DAILY
Qty: 30 | Refills: 1 | Status: ACTIVE | COMMUNITY
Start: 2025-06-11 | End: 1900-01-01

## 2025-06-13 ENCOUNTER — NON-APPOINTMENT (OUTPATIENT)
Age: 65
End: 2025-06-13

## 2025-06-23 ENCOUNTER — NON-APPOINTMENT (OUTPATIENT)
Age: 65
End: 2025-06-23

## 2025-07-02 ENCOUNTER — APPOINTMENT (OUTPATIENT)
Dept: INTERNAL MEDICINE | Facility: CLINIC | Age: 65
End: 2025-07-02
Payer: COMMERCIAL

## 2025-07-02 VITALS
RESPIRATION RATE: 16 BRPM | SYSTOLIC BLOOD PRESSURE: 107 MMHG | TEMPERATURE: 98 F | DIASTOLIC BLOOD PRESSURE: 68 MMHG | WEIGHT: 110.23 LBS | HEIGHT: 65 IN | OXYGEN SATURATION: 99 % | HEART RATE: 58 BPM

## 2025-07-02 VITALS
HEART RATE: 91 BPM | OXYGEN SATURATION: 98 % | BODY MASS INDEX: 18.33 KG/M2 | SYSTOLIC BLOOD PRESSURE: 111 MMHG | HEIGHT: 65 IN | DIASTOLIC BLOOD PRESSURE: 72 MMHG | WEIGHT: 110 LBS | TEMPERATURE: 97.8 F

## 2025-07-02 PROBLEM — L90.5 SCAR PAIN: Status: ACTIVE | Noted: 2025-07-02

## 2025-07-02 PROBLEM — Z79.2 NEED FOR ANTIBIOTIC PROPHYLAXIS FOR DENTAL PROCEDURE: Status: ACTIVE | Noted: 2025-07-02

## 2025-07-02 PROCEDURE — 99214 OFFICE O/P EST MOD 30 MIN: CPT | Mod: 25

## 2025-07-02 RX ORDER — LIDOCAINE 5 G/100G
5 OINTMENT TOPICAL
Qty: 1 | Refills: 1 | Status: ACTIVE | COMMUNITY
Start: 2025-07-02 | End: 1900-01-01

## 2025-07-02 RX ORDER — TRAMADOL HYDROCHLORIDE 50 MG/1
50 TABLET, COATED ORAL
Qty: 21 | Refills: 0 | Status: ACTIVE | COMMUNITY
Start: 2025-07-02 | End: 1900-01-01

## 2025-07-02 RX ORDER — ACETAMINOPHEN 500 MG/1
500 TABLET ORAL
Qty: 180 | Refills: 2 | Status: ACTIVE | COMMUNITY
Start: 2025-07-02 | End: 1900-01-01

## 2025-07-02 RX ORDER — ASPIRIN 81 MG/1
81 TABLET, DELAYED RELEASE ORAL
Qty: 60 | Refills: 0 | Status: ACTIVE | COMMUNITY
Start: 2025-07-02 | End: 1900-01-01

## 2025-07-02 RX ORDER — POVIDONE-IODINE 7.5 %
1 SOLUTION, NON-ORAL TOPICAL ONCE
Refills: 0 | Status: COMPLETED | OUTPATIENT
Start: 2025-07-07 | End: 2025-07-07

## 2025-07-02 RX ORDER — AMOXICILLIN 500 MG/1
500 CAPSULE ORAL
Qty: 8 | Refills: 1 | Status: ACTIVE | COMMUNITY
Start: 2025-07-02 | End: 1900-01-01

## 2025-07-02 RX ORDER — PANTOPRAZOLE 40 MG/1
40 TABLET, DELAYED RELEASE ORAL DAILY
Qty: 30 | Refills: 2 | Status: ACTIVE | COMMUNITY
Start: 2025-07-02 | End: 1900-01-01

## 2025-07-02 RX ORDER — CELECOXIB 100 MG/1
100 CAPSULE ORAL
Qty: 60 | Refills: 0 | Status: ACTIVE | COMMUNITY
Start: 2025-07-02 | End: 1900-01-01

## 2025-07-02 NOTE — H&P ADULT - NSHPLABSRESULTS_GEN_ALL_CORE
3M DOS Preop CBC, BMP, PT/PTT/INR,  within normal limits- reviewed by medical clearance.  Cr: 1.03  H/H: 10.9/33.1  Preop EKG: Sinus Star 53bpm, reviewed by medical clearance.   DOS

## 2025-07-02 NOTE — PRE-OP CHECKLIST - NS PREOP CHK HIBICLENS NA
From: Miles Callahan  To: Benedicto Toney  Sent: 8/16/2021 5:50 PM CDT  Subject: Other    Dads bp numbers from [facility] week after getting the new arm cuff, they still seem high. Im thinking his bp meds arent quit right. I do know when he was on the 93/106 Akira he was falling ALOT. it made him dizzy. Now with him being on this very low dose his nimbers are high. But hes on like 3 or 4 other ones too. Isnt there 1 o2 that could do it all for him. Granted i know he has alot wrong with his heart and hes worried about the expense. But let me know what would be the best and i will see what i can do i my end to make it happen.     Maureen   N/A

## 2025-07-02 NOTE — H&P ADULT - NSHPPHYSICALEXAM_GEN_ALL_CORE
MSK: + decreased ROM 2/2 pain, right hip       Remainder of exam per medical clearance note Gen: Alert and oriented, NAD  MSK: + decreased ROM 2/2 pain, right hip   2+ DP pulses palpable bilaterally, skin wwp, cap refill brisk  SILT to bilateral distal lower extremities  TA 2-3/5 bilaterally, EHL 3-4/5 bilaterally, EHL/GS 4+/5 bilaterally. Able to SLR bilaterally    Remainder of exam per medical clearance note

## 2025-07-02 NOTE — PRE-OP CHECKLIST - 1.
Pre-operative HOOS Jr. and Promis-10/VR-12 assessments completed; not instructed to use hibiclens at home Pre-operative HOOS Jr. and Promis-10/VR-12 assessments completed; not instructed to use Hibiclens at home

## 2025-07-02 NOTE — H&P ADULT - PROBLEM SELECTOR PLAN 1
Admit to Orthopaedic Service.  Presents today for elective right THR   Pt medically stable and cleared for procedure today by  Admit to Orthopaedic Service.  Presents today for elective right THR   Pt medically stable and cleared for procedure today by Dr. Lopez

## 2025-07-02 NOTE — H&P ADULT - HISTORY OF PRESENT ILLNESS
65F c.o right hip pain x     Has the patient used any narcotic more than 90 days prior to the date of surgery? YES or NO     Present for elective right total hip replacement  64yo female with right hip pain x 1 year. Patient reports constant hip pain that radiates down her right leg. She describes the pain as "throbbing" in nature without numbness/tingling. She reports having a left hip replacement 4 years ago that went well and without complications. She reports having muscular dystrophy which causes muscle weakness for her at baseline for which she uses bilateral leg braces to help her ambulate. She takes Tylenol at home for pain with minimal relief of symptoms. Pain persists despite conservative measures. Ambulates occasionally with the assistance of a cane.   Denies history of blood clots/seizures. Denies CP/SOB/N/V.     Has the patient used any narcotic more than 90 days prior to the date of surgery? NO     Present for elective right total hip replacement

## 2025-07-02 NOTE — PRE-OP CHECKLIST - MUPIRONCIN COMMENTS
Pre-operative HOOS Jr. and Promis-10/VR-12 assessments completed Pre-operative HOOS Jr. and Promis-10/VR-12 assessments completed; not instructed to use hibiclens at home

## 2025-07-03 ENCOUNTER — TRANSCRIPTION ENCOUNTER (OUTPATIENT)
Age: 65
End: 2025-07-03

## 2025-07-03 PROBLEM — Z01.810 PREOP CARDIOVASCULAR EXAM: Status: RESOLVED | Noted: 2025-06-06 | Resolved: 2025-07-03

## 2025-07-03 PROBLEM — R06.02 SHORTNESS OF BREATH AT REST: Status: RESOLVED | Noted: 2023-11-02 | Resolved: 2025-07-03

## 2025-07-03 PROBLEM — R82.90 ABNORMAL URINALYSIS: Status: RESOLVED | Noted: 2020-09-08 | Resolved: 2025-07-03

## 2025-07-03 PROBLEM — M25.551 ACUTE PAIN OF RIGHT HIP: Status: RESOLVED | Noted: 2023-11-13 | Resolved: 2025-07-03

## 2025-07-03 PROBLEM — Z87.09 HISTORY OF PARANASAL SINUS CONGESTION: Status: RESOLVED | Noted: 2019-06-28 | Resolved: 2025-07-03

## 2025-07-03 PROBLEM — Z20.822 ENCOUNTER FOR LABORATORY TESTING FOR COVID-19 VIRUS: Status: RESOLVED | Noted: 2020-07-17 | Resolved: 2025-07-03

## 2025-07-03 PROBLEM — Z11.1 SCREENING FOR TUBERCULOSIS: Status: RESOLVED | Noted: 2021-05-24 | Resolved: 2025-07-03

## 2025-07-03 PROBLEM — Z47.1 AFTERCARE FOLLOWING LEFT HIP JOINT REPLACEMENT SURGERY: Status: RESOLVED | Noted: 2021-07-21 | Resolved: 2025-07-03

## 2025-07-03 PROBLEM — Z23 NEED FOR INFLUENZA VACCINATION: Status: RESOLVED | Noted: 2023-10-20 | Resolved: 2025-07-03

## 2025-07-03 PROBLEM — Z13.820 SCREENING FOR OSTEOPOROSIS: Status: RESOLVED | Noted: 2019-02-08 | Resolved: 2025-07-03

## 2025-07-03 PROBLEM — Z00.129 PRE-SCHOOL HEALTH EXAMINATION: Status: RESOLVED | Noted: 2021-05-24 | Resolved: 2025-07-03

## 2025-07-03 PROBLEM — L73.9 FOLLICULITIS OF NOSE: Status: RESOLVED | Noted: 2024-08-29 | Resolved: 2025-07-03

## 2025-07-03 PROBLEM — H02.009 ENTROPION: Status: RESOLVED | Noted: 2020-09-08 | Resolved: 2025-07-03

## 2025-07-03 PROBLEM — L65.9 HAIR LOSS: Status: RESOLVED | Noted: 2021-02-22 | Resolved: 2025-07-03

## 2025-07-03 PROBLEM — Z86.03 HISTORY OF NEOPLASM OF UNCERTAIN BEHAVIOR OF SKIN: Status: RESOLVED | Noted: 2021-04-07 | Resolved: 2025-07-03

## 2025-07-03 PROBLEM — Z01.811 PRE-OPERATIVE RESPIRATORY EXAMINATION: Status: RESOLVED | Noted: 2021-07-07 | Resolved: 2025-07-03

## 2025-07-03 PROBLEM — J34.0 NASAL ULCER: Status: RESOLVED | Noted: 2020-07-17 | Resolved: 2025-07-03

## 2025-07-03 PROBLEM — Z87.898 HISTORY OF FATIGUE: Status: RESOLVED | Noted: 2023-07-17 | Resolved: 2025-07-03

## 2025-07-03 PROBLEM — M25.552 LEFT HIP PAIN: Status: RESOLVED | Noted: 2020-07-17 | Resolved: 2025-07-03

## 2025-07-03 PROBLEM — Z87.09 HISTORY OF NASAL DISCHARGE: Status: RESOLVED | Noted: 2024-08-29 | Resolved: 2025-07-03

## 2025-07-03 PROBLEM — R29.898 LEG HEAVINESS: Status: RESOLVED | Noted: 2020-07-17 | Resolved: 2025-07-03

## 2025-07-03 LAB
ALBUMIN SERPL ELPH-MCNC: 4.8 G/DL
ALP BLD-CCNC: 75 U/L
ALT SERPL-CCNC: 21 U/L
ANION GAP SERPL CALC-SCNC: 13 MMOL/L
APTT BLD: 28 SEC
AST SERPL-CCNC: 33 U/L
BILIRUB SERPL-MCNC: 0.2 MG/DL
BUN SERPL-MCNC: 25 MG/DL
CALCIUM SERPL-MCNC: 10.2 MG/DL
CHLORIDE SERPL-SCNC: 104 MMOL/L
CO2 SERPL-SCNC: 22 MMOL/L
CREAT SERPL-MCNC: 1.03 MG/DL
EGFRCR SERPLBLD CKD-EPI 2021: 61 ML/MIN/1.73M2
GLUCOSE SERPL-MCNC: 89 MG/DL
HCT VFR BLD CALC: 33.1 %
HGB BLD-MCNC: 10.9 G/DL
INR PPP: 0.85 RATIO
MCHC RBC-ENTMCNC: 30.6 PG
MCHC RBC-ENTMCNC: 32.9 G/DL
MCV RBC AUTO: 93 FL
PLATELET # BLD AUTO: 197 K/UL
POTASSIUM SERPL-SCNC: 5.2 MMOL/L
PREALB SERPL NEPH-MCNC: 32 MG/DL
PROT SERPL-MCNC: 7.2 G/DL
PT BLD: 10.1 SEC
RBC # BLD: 3.56 M/UL
RBC # FLD: 12.9 %
SODIUM SERPL-SCNC: 139 MMOL/L
WBC # FLD AUTO: 5.28 K/UL

## 2025-07-03 NOTE — ASU PATIENT PROFILE, ADULT - PAIN SCALE PREFERRED, PROFILE
-- DO NOT REPLY / DO NOT REPLY ALL --  -- Message is from Engagement Center Operations (ECO) --    ONLY TO BE USED WITHIN A REFILL MEDICATION ENCOUNTER    Med Refill  Is the patient currently having any symptoms?: No/Non-Emergent symptoms    Name of medication requested: See pended med    Is this the first request for the medication in the last 48 hours?: Yes    Patient is requesting a medication refill - medication is on active medication list    Patient is currently OUT of the requested medication - sent as HIGH priority    Full name of the provider who ordered the medication: Miladys Bustillos DO     Clinic site name / Account # for provider: [facility]     Preferred Pharmacy: Pharmacy  [facility] - [place], [place] - [address] [place] [address] [address]    Patient confirmed the above pharmacy as correct?  Yes    Caller Information         Type Contact Phone/Fax    02/02/2024 01:49 PM CST Phone (Incoming) Max Gonsalez (Emergency Contact) [phone]            Alternative phone number: No    Can a detailed message be left?: Yes         numerical 0-10

## 2025-07-07 ENCOUNTER — APPOINTMENT (OUTPATIENT)
Dept: ORTHOPEDIC SURGERY | Facility: HOSPITAL | Age: 65
End: 2025-07-07

## 2025-07-07 ENCOUNTER — TRANSCRIPTION ENCOUNTER (OUTPATIENT)
Age: 65
End: 2025-07-07

## 2025-07-07 ENCOUNTER — INPATIENT (INPATIENT)
Facility: HOSPITAL | Age: 65
LOS: 1 days | Discharge: HOME | DRG: 470 | End: 2025-07-09
Attending: ORTHOPAEDIC SURGERY | Admitting: ORTHOPAEDIC SURGERY
Payer: COMMERCIAL

## 2025-07-07 DIAGNOSIS — Z86.69 PERSONAL HISTORY OF OTHER DISEASES OF THE NERVOUS SYSTEM AND SENSE ORGANS: ICD-10-CM

## 2025-07-07 DIAGNOSIS — F41.9 ANXIETY DISORDER, UNSPECIFIED: ICD-10-CM

## 2025-07-07 DIAGNOSIS — Z96.642 PRESENCE OF LEFT ARTIFICIAL HIP JOINT: Chronic | ICD-10-CM

## 2025-07-07 DIAGNOSIS — M16.9 OSTEOARTHRITIS OF HIP, UNSPECIFIED: ICD-10-CM

## 2025-07-07 PROCEDURE — 27130 TOTAL HIP ARTHROPLASTY: CPT | Mod: RT

## 2025-07-07 DEVICE — SHELL ACET G7 OSSEOTI C HEMISPHR 3 HOLE 48MM: Type: IMPLANTABLE DEVICE | Status: FUNCTIONAL

## 2025-07-07 DEVICE — STEM FEM AVENIR CMPL HA VAR COL SZ 4: Type: IMPLANTABLE DEVICE | Status: FUNCTIONAL

## 2025-07-07 DEVICE — BIOLOX CERAMIC FEMORAL HEAD: Type: IMPLANTABLE DEVICE | Status: FUNCTIONAL

## 2025-07-07 DEVICE — LINER ACET VIT E G7 NEUT SZ C 32MM: Type: IMPLANTABLE DEVICE | Status: FUNCTIONAL

## 2025-07-07 RX ORDER — APREPITANT 40 MG/1
40 CAPSULE ORAL ONCE
Refills: 0 | Status: COMPLETED | OUTPATIENT
Start: 2025-07-07 | End: 2025-07-07

## 2025-07-07 RX ORDER — MAGNESIUM HYDROXIDE 400 MG/5ML
30 SUSPENSION ORAL DAILY
Refills: 0 | Status: DISCONTINUED | OUTPATIENT
Start: 2025-07-07 | End: 2025-07-09

## 2025-07-07 RX ORDER — TRAMADOL HYDROCHLORIDE 50 MG/1
25 TABLET, FILM COATED ORAL EVERY 4 HOURS
Refills: 0 | Status: DISCONTINUED | OUTPATIENT
Start: 2025-07-07 | End: 2025-07-09

## 2025-07-07 RX ORDER — OXYCODONE HYDROCHLORIDE 30 MG/1
10 TABLET ORAL EVERY 4 HOURS
Refills: 0 | Status: DISCONTINUED | OUTPATIENT
Start: 2025-07-07 | End: 2025-07-07

## 2025-07-07 RX ORDER — CITALOPRAM 20 MG/1
20 TABLET ORAL DAILY
Refills: 0 | Status: DISCONTINUED | OUTPATIENT
Start: 2025-07-07 | End: 2025-07-09

## 2025-07-07 RX ORDER — CELECOXIB 50 MG/1
200 CAPSULE ORAL EVERY 12 HOURS
Refills: 0 | Status: DISCONTINUED | OUTPATIENT
Start: 2025-07-08 | End: 2025-07-08

## 2025-07-07 RX ORDER — ASPIRIN 325 MG
81 TABLET ORAL EVERY 12 HOURS
Refills: 0 | Status: DISCONTINUED | OUTPATIENT
Start: 2025-07-08 | End: 2025-07-09

## 2025-07-07 RX ORDER — OXYCODONE HYDROCHLORIDE 30 MG/1
5 TABLET ORAL EVERY 4 HOURS
Refills: 0 | Status: DISCONTINUED | OUTPATIENT
Start: 2025-07-07 | End: 2025-07-07

## 2025-07-07 RX ORDER — SODIUM CHLORIDE 9 G/1000ML
1000 INJECTION, SOLUTION INTRAVENOUS
Refills: 0 | Status: DISCONTINUED | OUTPATIENT
Start: 2025-07-07 | End: 2025-07-09

## 2025-07-07 RX ORDER — ACETAMINOPHEN 500 MG/5ML
1000 LIQUID (ML) ORAL ONCE
Refills: 0 | Status: COMPLETED | OUTPATIENT
Start: 2025-07-07 | End: 2025-07-07

## 2025-07-07 RX ORDER — CELECOXIB 50 MG/1
200 CAPSULE ORAL ONCE
Refills: 0 | Status: COMPLETED | OUTPATIENT
Start: 2025-07-07 | End: 2025-07-07

## 2025-07-07 RX ORDER — POLYETHYLENE GLYCOL 3350 17 G/17G
17 POWDER, FOR SOLUTION ORAL AT BEDTIME
Refills: 0 | Status: DISCONTINUED | OUTPATIENT
Start: 2025-07-07 | End: 2025-07-09

## 2025-07-07 RX ORDER — HYDROMORPHONE/SOD CHLOR,ISO/PF 2 MG/10 ML
0.5 SYRINGE (ML) INJECTION EVERY 4 HOURS
Refills: 0 | Status: DISCONTINUED | OUTPATIENT
Start: 2025-07-07 | End: 2025-07-09

## 2025-07-07 RX ORDER — SENNA 187 MG
2 TABLET ORAL AT BEDTIME
Refills: 0 | Status: DISCONTINUED | OUTPATIENT
Start: 2025-07-07 | End: 2025-07-09

## 2025-07-07 RX ORDER — TRAMADOL HYDROCHLORIDE 50 MG/1
50 TABLET, FILM COATED ORAL EVERY 4 HOURS
Refills: 0 | Status: DISCONTINUED | OUTPATIENT
Start: 2025-07-07 | End: 2025-07-09

## 2025-07-07 RX ORDER — ACETAMINOPHEN 500 MG/5ML
1000 LIQUID (ML) ORAL EVERY 8 HOURS
Refills: 0 | Status: DISCONTINUED | OUTPATIENT
Start: 2025-07-07 | End: 2025-07-09

## 2025-07-07 RX ORDER — CEFAZOLIN SODIUM IN 0.9 % NACL 3 G/100 ML
2000 INTRAVENOUS SOLUTION, PIGGYBACK (ML) INTRAVENOUS EVERY 8 HOURS
Refills: 0 | Status: COMPLETED | OUTPATIENT
Start: 2025-07-07 | End: 2025-07-08

## 2025-07-07 RX ORDER — ONDANSETRON HCL/PF 4 MG/2 ML
4 VIAL (ML) INJECTION EVERY 6 HOURS
Refills: 0 | Status: DISCONTINUED | OUTPATIENT
Start: 2025-07-07 | End: 2025-07-09

## 2025-07-07 RX ORDER — HYDROMORPHONE/SOD CHLOR,ISO/PF 2 MG/10 ML
0.5 SYRINGE (ML) INJECTION
Refills: 0 | Status: DISCONTINUED | OUTPATIENT
Start: 2025-07-07 | End: 2025-07-09

## 2025-07-07 RX ADMIN — Medication 0.5 MILLIGRAM(S): at 17:51

## 2025-07-07 RX ADMIN — Medication 1 APPLICATION(S): at 06:41

## 2025-07-07 RX ADMIN — Medication 1000 MILLIGRAM(S): at 21:35

## 2025-07-07 RX ADMIN — TRAMADOL HYDROCHLORIDE 25 MILLIGRAM(S): 50 TABLET, FILM COATED ORAL at 21:48

## 2025-07-07 RX ADMIN — TRAMADOL HYDROCHLORIDE 25 MILLIGRAM(S): 50 TABLET, FILM COATED ORAL at 22:48

## 2025-07-07 RX ADMIN — Medication 1000 MILLIGRAM(S): at 06:43

## 2025-07-07 RX ADMIN — Medication 0.5 MILLIGRAM(S): at 11:46

## 2025-07-07 RX ADMIN — Medication 2 TABLET(S): at 21:35

## 2025-07-07 RX ADMIN — Medication 100 MILLIGRAM(S): at 17:44

## 2025-07-07 RX ADMIN — CELECOXIB 200 MILLIGRAM(S): 50 CAPSULE ORAL at 06:43

## 2025-07-07 RX ADMIN — Medication 0.5 MILLIGRAM(S): at 18:51

## 2025-07-07 RX ADMIN — APREPITANT 40 MILLIGRAM(S): 40 CAPSULE ORAL at 06:43

## 2025-07-07 RX ADMIN — Medication 1000 MILLIGRAM(S): at 15:26

## 2025-07-07 NOTE — DISCHARGE NOTE PROVIDER - NSDCFUADDINST_GEN_ALL_CORE_FT
**SEE DR. BAER'S DISCHARGE INSTRUCTION SHEET**    ACTIVITY:  - Weightbear as tolerated with assistive device. No strenuous activity, heavy lifting, driving or returning to work until cleared by MD.  - You may experience postoperative swelling on the operative extremity. You may ice the surgery site for 20 minute intervals.     DRESSING:  - Follow dressing/incision care instructions on Dr. Baer's discharge sheet.   - Keep your incision clean and dry. Do not pick at your incision. Do not apply creams, ointments or oils to your incision until cleared by your surgeon. Do not soak your incision in sitting water (ie tubs, pools, lakes, etc.) until cleared by your surgeon. You may let clean, running water fall over your incision.    MEDICATION/ANTICOAGULATION:  -You have been prescribed aspirin as a preventative to help prevent postoperative blood clots. Please take this medication as prescribed.   - You have been prescribed medications for pain:    - Tylenol. 1,000mg every 8 hours. Do not exceed 3,000mg daily. This medication is most effective taken on a routine schedule the first 1-2 weeks after surgery.     - For more severe pain, take Tylenol with the addition of narcotic pain medication. Take this medication as prescribed. This medication may cause drowsiness or dizziness. Do not operate machinery. This medication may cause constipation.  - For all other medications, follow instructions on medication bottle or refer to Dr. Baer's discharge instruction sheet.   -Try to have regular bowel movements. Take stool softener or laxative if necessary. You may wish to take Miralax daily until you have regular bowel movements.   - If you have been prescribed Aspirin or an anti-inflammatory, please take Prilosec once a day, before breakfast, until no longer taking Aspirin or anti-inflammatory. This will help protect your stomach.  - If you have a pain management physician, please follow-up with them postoperatively.   - If you experience any negative side effects of your medications, please call your surgeon's office to discuss.    Follow-up:  - Call to schedule an appt with Dr. Baer for follow up. If you have staples or sutures they will be removed in office.  - Please follow-up with your primary care physician or any other specialist you see postoperatively, if needed.   - Contact your doctor if you experience: fever greater than 101.5, chills, chest pain, difficulty breathing, redness or excessive drainage around the incision, other concerns. **SEE DR. BAER'S DISCHARGE INSTRUCTION SHEET**    ACTIVITY:  - Weightbear as tolerated with assistive device. No strenuous activity, heavy lifting, driving or returning to work until cleared by MD.  - You may experience postoperative swelling on the operative extremity. You may ice the surgery site for 20 minute intervals.     DRESSING:  - Follow dressing/incision care instructions on Dr. Baer's discharge sheet.   - Keep your incision clean and dry. Do not pick at your incision. Do not apply creams, ointments or oils to your incision until cleared by your surgeon. Do not soak your incision in sitting water (ie tubs, pools, lakes, etc.) until cleared by your surgeon. You may let clean, running water fall over your incision.    MEDICATION/ANTICOAGULATION:  - You have been prescribed aspirin as a preventative to help prevent postoperative blood clots. Please take this medication as prescribed.   - You have been prescribed medications for pain:    - Tylenol. 1,000mg every 8 hours. Do not exceed 3,000mg daily. This medication is most effective taken on a routine schedule the first 1-2 weeks after surgery.     - For more severe pain, take Tylenol with the addition of narcotic pain medication. Take this medication as prescribed. This medication may cause drowsiness or dizziness. Do not operate machinery. This medication may cause constipation.  - For all other medications, follow instructions on medication bottle or refer to Dr. Baer's discharge instruction sheet.   - Try to have regular bowel movements. Take stool softener or laxative if necessary. You may wish to take Miralax daily until you have regular bowel movements.   - If you have been prescribed Aspirin or an anti-inflammatory, please take Prilosec once a day, before breakfast, until no longer taking Aspirin or anti-inflammatory. This will help protect your stomach.  - If you have a pain management physician, please follow-up with them postoperatively.   - If you experience any negative side effects of your medications, please call your surgeon's office to discuss.    Follow-up:  - Call to schedule an appt with Dr. Baer for follow up. If you have staples or sutures they will be removed in office.  - Please follow-up with your primary care physician or any other specialist you see postoperatively, if needed.   - Contact your doctor if you experience: fever greater than 101.5, chills, chest pain, difficulty breathing, redness or excessive drainage around the incision, other concerns.

## 2025-07-07 NOTE — PHYSICAL THERAPY INITIAL EVALUATION ADULT - PERSONAL SAFETY AND JUDGMENT, REHAB EVAL
Airway  Urgency: elective    Date/Time: 12/30/2022 7:52 AM  Airway not difficult    General Information and Staff    Patient location during procedure: OR  Anesthesiologist: Dinesh Gates MD  CRNA/CAA: Lidia Montenegro CRNA    Indications and Patient Condition  Indications for airway management: airway protection    Preoxygenated: yes  MILS not maintained throughout  Mask difficulty assessment: 1 - vent by mask    Final Airway Details  Final airway type: endotracheal airway      Successful airway: ETT  Cuffed: yes   Successful intubation technique: direct laryngoscopy  Endotracheal tube insertion site: oral  Blade: Ester  Blade size: 3  ETT size (mm): 7.0  Cormack-Lehane Classification: grade I - full view of glottis  Placement verified by: chest auscultation and capnometry   Cuff volume (mL): 7  Measured from: lips  ETT/EBT  to lips (cm): 21  Number of attempts at approach: 1  Assessment: lips, teeth, and gum same as pre-op and atraumatic intubation    Additional Comments  Pt preoxygenated prior to induction, easy mask airway, atraumatic intubation,+ ETCO2, + bs bilat,  ETT secured and connected to ventilator.         intact

## 2025-07-07 NOTE — DISCHARGE NOTE PROVIDER - NSDCFUSCHEDAPPT_GEN_ALL_CORE_FT
[facility]  ORTHOSURG [address]  Scheduled Appointment: 07/21/2025    Laney Lopez  [facility]  INTMED [address]  Scheduled Appointment: 09/09/2025

## 2025-07-07 NOTE — DISCHARGE NOTE PROVIDER - HOSPITAL COURSE
Admitted for R hip pain on 7/7  Surgery: Right anterior CAITLIN on 7/7  Sharon-op Antibiotics: ancef  Pain control  DVT prophylaxis ASA 81 bid  OOB/Physical Therapy  Medicine co-management   Admitted for R hip pain on 7/7  Surgery: Right anterior CAITLIN on 7/7  Sharon-op Antibiotics: ancef  Pain control  DVT prophylaxis ASA 81 bid  OOB/Physical Therapy  Medicine co-management       Admitted to Orthopedic service on 7/7/25  Surgery: Right anterior CAITLIN on 7/7  Sharon-op Antibiotics: ancef  Pain control  DVT prophylaxis ASA 81 bid  OOB/Physical Therapy  Medicine co-management

## 2025-07-07 NOTE — DISCHARGE NOTE PROVIDER - CARE PROVIDER_API CALL
Tavo Sahu  Orthopaedic Surgery  [address]  [place] [address]  Phone: [phone]  Fax: [phone]  Follow Up Time:

## 2025-07-07 NOTE — PRE-ANESTHESIA EVALUATION ADULT - NSANTHOSAYNRD_GEN_A_CORE
No. MARISELA screening performed.  STOP BANG Legend: 0-2 = LOW Risk; 3-4 = INTERMEDIATE Risk; 5-8 = HIGH Risk

## 2025-07-07 NOTE — PHYSICAL THERAPY INITIAL EVALUATION ADULT - ACTIVE RANGE OF MOTION EXAMINATION, REHAB EVAL
except no AROM bilateral ankle DF/bilateral upper extremity Active ROM was WFL (within functional limits)/bilateral  lower extremity Active ROM was WFL (within functional limits)/deficits as listed below

## 2025-07-07 NOTE — PROGRESS NOTE ADULT - SUBJECTIVE AND OBJECTIVE BOX
Ortho Post Op Check    Procedure: right total hip replacement, anterior approach  Surgeon: Dr. Sahu    Pt comfortable without complaints, pain controlled. She reports that she got up with PT and ambulated in the room.   Denies CP, SOB, N/V, numbness/tingling     Vital Signs Last 24 Hrs  T(C): 36.8 (07-07-25 @ 12:18), Max: 36.8 (07-07-25 @ 12:18)  T(F): 98.2 (07-07-25 @ 12:18), Max: 98.2 (07-07-25 @ 12:18)  HR: 60 (07-07-25 @ 12:50) (44 - 64)  BP: 105/66 (07-07-25 @ 13:03) (87/52 - 109/66)  BP(mean): 74 (07-07-25 @ 12:10) (71 - 80)  RR: 15 (07-07-25 @ 12:18) (12 - 19)  SpO2: 94% (07-07-25 @ 12:18) (94% - 100%)  I&O's Summary    07 Jul 2025 07:01  -  07 Jul 2025 13:37  --------------------------------------------------------  IN: 1200 mL / OUT: 100 mL / NET: 1100 mL    PE  General: Pt Alert and oriented, NAD  DSG aquacel C/D/I  Pulses: +2DP  Sensation: intact to light touch bilateral lower extremities   Motor: EHL/FHL/TA/GS    Post-op X-Ray: fluoroscopy utilized during OR    A/P: 65yFemale POD#0 s/p right total hip replacement, anterior approach  - Stable  - Pain Control  - DVT ppx: ASA 81mg BID POD1  - Post op abx: ancef periop  - PT, WBS: WBAT  DISPO: pending PT eval    Ortho Pager [phone] Ortho Post Op Check    Procedure: right total hip replacement, anterior approach  Surgeon: Dr. Sahu    Pt comfortable without complaints, pain controlled. She reports that she got up with PT and ambulated in the room.   Denies CP, SOB, N/V, numbness/tingling     Vital Signs Last 24 Hrs  T(C): 36.8 (07-07-25 @ 12:18), Max: 36.8 (07-07-25 @ 12:18)  T(F): 98.2 (07-07-25 @ 12:18), Max: 98.2 (07-07-25 @ 12:18)  HR: 60 (07-07-25 @ 12:50) (44 - 64)  BP: 105/66 (07-07-25 @ 13:03) (87/52 - 109/66)  BP(mean): 74 (07-07-25 @ 12:10) (71 - 80)  RR: 15 (07-07-25 @ 12:18) (12 - 19)  SpO2: 94% (07-07-25 @ 12:18) (94% - 100%)  I&O's Summary    07 Jul 2025 07:01  -  07 Jul 2025 13:37  --------------------------------------------------------  IN: 1200 mL / OUT: 100 mL / NET: 1100 mL    PE  General: Pt Alert and oriented, NAD  DSG aquacel C/D/I  Pulses: +2DP  Sensation: intact to light touch bilateral lower extremities   Motor: TA 2-3/5 bilaterally, EHL 3-4/5 bilaterally, EHL/GS 4+/5 bilaterally.    Post-op X-Ray: fluoroscopy utilized during OR    A/P: 65yFemale POD#0 s/p right total hip replacement, anterior approach  - Stable  - Pain Control  - DVT ppx: ASA 81mg BID POD1  - Post op abx: ancef periop  - PT, WBS: WBAT  DISPO: pending PT eval    Ortho Pager [phone]

## 2025-07-07 NOTE — PHYSICAL THERAPY INITIAL EVALUATION ADULT - ADDITIONAL COMMENTS
Patient lives alone in a 2nd flight walk up apartment in a [facility] with 8 [address] all with a rail. At times used a cane for ambulation and also owns a RW. [facility] bilateral foot drop

## 2025-07-07 NOTE — DISCHARGE NOTE PROVIDER - NSDCMRMEDTOKEN_GEN_ALL_CORE_FT
acetaminophen 500 mg oral tablet: 2 tab(s) orally every 8 hours for pain control  citalopram 20 mg oral tablet: 1 tab(s) orally once a day  minoxidil: 2.5 milligram(s) once a day  spironolactone 50 mg oral tablet: 3 tab(s) orally once a day  Vitamin D3 gummies: daily   acetaminophen 500 mg oral tablet: 2 tab(s) orally every 8 hours for pain control  aspirin 81 mg oral delayed release tablet: 1 tab(s) orally every 12 hours  celecoxib 100 mg oral capsule: 1 cap(s) orally 2 times a day  citalopram 20 mg oral tablet: 1 tab(s) orally once a day  minoxidil: 2.5 milligram(s) once a day  pantoprazole 40 mg oral delayed release tablet: 1 tab(s) orally once a day (before a meal)  spironolactone 50 mg oral tablet: 3 tab(s) orally once a day  traMADol 50 mg oral tablet: 1 tab(s) orally every 4 hours As needed Severe Pain (7 - 10)  Vitamin D3 gummies: daily   acetaminophen 500 mg oral tablet: 2 tab(s) orally every 8 hours for pain control  aspirin 81 mg oral delayed release tablet: 1 tab(s) orally every 12 hours  celecoxib 100 mg oral capsule: 1 cap(s) orally 2 times a day  citalopram 20 mg oral tablet: 1 tab(s) orally once a day  minoxidil: 2.5 milligram(s) once a day  pantoprazole 40 mg oral delayed release tablet: 1 tab(s) orally once a day (before a meal)  polyethylene glycol 3350 oral powder for reconstitution: 17 gram(s) orally once a day (at bedtime)  spironolactone 50 mg oral tablet: 3 tab(s) orally once a day Resume tomorrow  traMADol 50 mg oral tablet: 1 tab(s) orally every 4 hours As needed Severe Pain (7 - 10)  Vitamin D3 gummies: daily

## 2025-07-07 NOTE — PHYSICAL THERAPY INITIAL EVALUATION ADULT - PERTINENT HX OF CURRENT PROBLEM, REHAB EVAL
64yo female with right hip pain x 1 year. Patient reports constant hip pain that radiates down her right leg. She describes the pain as "throbbing" in nature without numbness/tingling. She reports having a left hip replacement 4 years ago that went well and without complications. She reports having muscular dystrophy which causes muscle weakness for her at baseline for which she uses bilateral leg braces to help her ambulate. She takes Tylenol at home for pain with minimal relief of symptoms. Pain persists despite conservative measures. Ambulates occasionally with the assistance of a cane.

## 2025-07-07 NOTE — PHYSICAL THERAPY INITIAL EVALUATION ADULT - MANUAL MUSCLE TESTING RESULTS, REHAB EVAL
except bilateral ankle DF 0/5. Right hip/knee NT due to surgery/no strength deficits were identified

## 2025-07-07 NOTE — DISCHARGE NOTE PROVIDER - NSDCCPTREATMENT_GEN_ALL_CORE_FT
PRINCIPAL PROCEDURE  Procedure: Right total hip replacement  Findings and Treatment:      PRINCIPAL PROCEDURE  Procedure: Right total hip replacement  Findings and Treatment: osteoarthritis

## 2025-07-07 NOTE — PRE-ANESTHESIA EVALUATION ADULT - NSANTHOBSERVEDRD_ENT_A_CORE
Latest Reference Range & Units 08/26/22 10:25   Sodium 136 - 145 mmol/L 139   Potassium 3.5 - 5.1 mmol/L 4.4   Chloride 95 - 110 mmol/L 102   CO2 23 - 29 mmol/L 28   Anion Gap 8 - 16 mmol/L 9   BUN 6 - 20 mg/dL 24 (H)   Creatinine 0.5 - 1.4 mg/dL 1.6 (H)   eGFR >60 mL/min/1.73 m^2 39 !   Glucose 70 - 110 mg/dL 107   Calcium 8.7 - 10.5 mg/dL 10.0   Phosphorus 2.7 - 4.5 mg/dL 3.9   Albumin 3.5 - 5.2 g/dL 4.1   PTH 9.0 - 77.0 pg/mL 143.0 (H)   C Telopeptide (CTX), Serum pg/mL 732      Latest Reference Range & Units 08/26/22 10:25   Vit D, 25-Hydroxy 30 - 96 ng/mL 35     First dose of evenity 8/31/2022  Will need appointment to see me 6/2023   No

## 2025-07-07 NOTE — DISCHARGE NOTE PROVIDER - NSDCCPCAREPLAN_GEN_ALL_CORE_FT
PRINCIPAL DISCHARGE DIAGNOSIS  Diagnosis: OA (osteoarthritis) of hip  Assessment and Plan of Treatment:      PRINCIPAL DISCHARGE DIAGNOSIS  Diagnosis: OA (osteoarthritis) of hip  Assessment and Plan of Treatment: R total hip arthoplasty

## 2025-07-08 ENCOUNTER — TRANSCRIPTION ENCOUNTER (OUTPATIENT)
Age: 65
End: 2025-07-08

## 2025-07-08 LAB
ANION GAP SERPL CALC-SCNC: 9 MMOL/L — SIGNIFICANT CHANGE UP (ref 5–17)
BUN SERPL-MCNC: 26 MG/DL — HIGH (ref 7–23)
CALCIUM SERPL-MCNC: 9.4 MG/DL — SIGNIFICANT CHANGE UP (ref 8.4–10.5)
CHLORIDE SERPL-SCNC: 104 MMOL/L — SIGNIFICANT CHANGE UP (ref 96–108)
CO2 SERPL-SCNC: 24 MMOL/L — SIGNIFICANT CHANGE UP (ref 22–31)
CREAT SERPL-MCNC: 0.94 MG/DL — SIGNIFICANT CHANGE UP (ref 0.5–1.3)
EGFR: 68 ML/MIN/1.73M2 — SIGNIFICANT CHANGE UP
EGFR: 68 ML/MIN/1.73M2 — SIGNIFICANT CHANGE UP
GLUCOSE SERPL-MCNC: 126 MG/DL — HIGH (ref 70–99)
HCT VFR BLD CALC: 27.4 % — LOW (ref 34.5–45)
HGB BLD-MCNC: 8.9 G/DL — LOW (ref 11.5–15.5)
MCHC RBC-ENTMCNC: 30.5 PG — SIGNIFICANT CHANGE UP (ref 27–34)
MCHC RBC-ENTMCNC: 32.5 G/DL — SIGNIFICANT CHANGE UP (ref 32–36)
MCV RBC AUTO: 93.8 FL — SIGNIFICANT CHANGE UP (ref 80–100)
NRBC # BLD AUTO: 0 K/UL — SIGNIFICANT CHANGE UP (ref 0–0)
NRBC # FLD: 0 K/UL — SIGNIFICANT CHANGE UP (ref 0–0)
NRBC BLD AUTO-RTO: 0 /100 WBCS — SIGNIFICANT CHANGE UP (ref 0–0)
PLATELET # BLD AUTO: 178 K/UL — SIGNIFICANT CHANGE UP (ref 150–400)
PMV BLD: 10.6 FL — SIGNIFICANT CHANGE UP (ref 7–13)
POTASSIUM SERPL-MCNC: 4.6 MMOL/L — SIGNIFICANT CHANGE UP (ref 3.5–5.3)
POTASSIUM SERPL-SCNC: 4.6 MMOL/L — SIGNIFICANT CHANGE UP (ref 3.5–5.3)
RBC # BLD: 2.92 M/UL — LOW (ref 3.8–5.2)
RBC # FLD: 12.6 % — SIGNIFICANT CHANGE UP (ref 10.3–14.5)
SODIUM SERPL-SCNC: 137 MMOL/L — SIGNIFICANT CHANGE UP (ref 135–145)
WBC # BLD: 10.4 K/UL — SIGNIFICANT CHANGE UP (ref 3.8–10.5)
WBC # FLD AUTO: 10.4 K/UL — SIGNIFICANT CHANGE UP (ref 3.8–10.5)

## 2025-07-08 PROCEDURE — 99223 1ST HOSP IP/OBS HIGH 75: CPT

## 2025-07-08 PROCEDURE — 85027 COMPLETE CBC AUTOMATED: CPT

## 2025-07-08 PROCEDURE — 36415 COLL VENOUS BLD VENIPUNCTURE: CPT

## 2025-07-08 PROCEDURE — 97161 PT EVAL LOW COMPLEX 20 MIN: CPT

## 2025-07-08 PROCEDURE — 76000 FLUOROSCOPY <1 HR PHYS/QHP: CPT

## 2025-07-08 PROCEDURE — 80048 BASIC METABOLIC PNL TOTAL CA: CPT

## 2025-07-08 RX ORDER — ASPIRIN 325 MG
1 TABLET ORAL
Qty: 0 | Refills: 0 | DISCHARGE
Start: 2025-07-08

## 2025-07-08 RX ORDER — CELECOXIB 50 MG/1
100 CAPSULE ORAL
Refills: 0 | Status: DISCONTINUED | OUTPATIENT
Start: 2025-07-08 | End: 2025-07-09

## 2025-07-08 RX ORDER — CELECOXIB 50 MG/1
1 CAPSULE ORAL
Qty: 0 | Refills: 0 | DISCHARGE
Start: 2025-07-08

## 2025-07-08 RX ORDER — TRAMADOL HYDROCHLORIDE 50 MG/1
1 TABLET, FILM COATED ORAL
Qty: 0 | Refills: 0 | DISCHARGE
Start: 2025-07-08

## 2025-07-08 RX ADMIN — Medication 81 MILLIGRAM(S): at 09:42

## 2025-07-08 RX ADMIN — TRAMADOL HYDROCHLORIDE 25 MILLIGRAM(S): 50 TABLET, FILM COATED ORAL at 17:01

## 2025-07-08 RX ADMIN — Medication 2.5 MILLIGRAM(S): at 06:57

## 2025-07-08 RX ADMIN — Medication 81 MILLIGRAM(S): at 22:21

## 2025-07-08 RX ADMIN — Medication 1000 MILLIGRAM(S): at 22:21

## 2025-07-08 RX ADMIN — Medication 1000 MILLIGRAM(S): at 13:57

## 2025-07-08 RX ADMIN — Medication 1000 MILLIGRAM(S): at 23:21

## 2025-07-08 RX ADMIN — Medication 100 MILLIGRAM(S): at 00:25

## 2025-07-08 RX ADMIN — CELECOXIB 100 MILLIGRAM(S): 50 CAPSULE ORAL at 18:01

## 2025-07-08 RX ADMIN — TRAMADOL HYDROCHLORIDE 25 MILLIGRAM(S): 50 TABLET, FILM COATED ORAL at 18:01

## 2025-07-08 RX ADMIN — CELECOXIB 200 MILLIGRAM(S): 50 CAPSULE ORAL at 05:33

## 2025-07-08 RX ADMIN — CELECOXIB 100 MILLIGRAM(S): 50 CAPSULE ORAL at 17:01

## 2025-07-08 RX ADMIN — Medication 2 TABLET(S): at 22:21

## 2025-07-08 RX ADMIN — Medication 40 MILLIGRAM(S): at 05:33

## 2025-07-08 RX ADMIN — TRAMADOL HYDROCHLORIDE 25 MILLIGRAM(S): 50 TABLET, FILM COATED ORAL at 06:16

## 2025-07-08 RX ADMIN — TRAMADOL HYDROCHLORIDE 25 MILLIGRAM(S): 50 TABLET, FILM COATED ORAL at 12:43

## 2025-07-08 RX ADMIN — TRAMADOL HYDROCHLORIDE 25 MILLIGRAM(S): 50 TABLET, FILM COATED ORAL at 13:43

## 2025-07-08 RX ADMIN — CITALOPRAM 20 MILLIGRAM(S): 20 TABLET ORAL at 07:01

## 2025-07-08 RX ADMIN — Medication 1000 MILLIGRAM(S): at 05:33

## 2025-07-08 NOTE — DISCHARGE NOTE NURSING/CASE MANAGEMENT/SOCIAL WORK - FINANCIAL ASSISTANCE
[facility] provides services at a reduced cost to those who are determined to be eligible through [facility]’s financial assistance program. Information regarding [facility]’s financial assistance program can be found by going to [email or web address] or by calling [phone].

## 2025-07-08 NOTE — OCCUPATIONAL THERAPY INITIAL EVALUATION ADULT - MANUAL MUSCLE TESTING RESULTS, REHAB EVAL
no strength deficits were identified  except bilateral ankle DF 0/5. Right hip/knee NT due to surgery

## 2025-07-08 NOTE — PROGRESS NOTE ADULT - SUBJECTIVE AND OBJECTIVE BOX
Ortho Progress Note    Procedure:  Anterior CAITLIN   Surgeon: Dr. ELIO Sahu    Pt comfortable without complaints, pain controlled  Denies CP, SOB, N/V, lightheadedness, dizziness, numbness/tingling     Vital Signs Last 24 Hrs  T(C): 36.5 (07-08-25 @ 08:20), Max: 36.8 (07-08-25 @ 05:29)  T(F): 97.7 (07-08-25 @ 08:20), Max: 98.2 (07-08-25 @ 05:29)  HR: 62 (07-08-25 @ 08:20) (62 - 69)  BP: 98/60 (07-08-25 @ 10:23) (89/55 - 98/60)  BP(mean): 71 (07-08-25 @ 05:29) (71 - 71)  RR: 17 (07-08-25 @ 08:20) (17 - 18)  SpO2: 99% (07-08-25 @ 08:20) (99% - 99%)    General: AAOx3, NAD  Aquacel DSG C/D/I  Sensation: SILT grossly SPN/DPN/Saph/Adriana/Tib  Motor: 5/5 Q/HS/GS/FHL; 3/5 TA/EHL bilaterally consistent with pre-op baseline per patient  Pulses: 2+ DP    Post-op X-Ray: fluoroscopy utilized intraoperatively    A/P: 65y Female POD #1 s/p anterior CAITLIN by Dr. ELIO Sahu  - VSS, [facility] medicine recs  - Anemia - f/u with PCP  - Pain Control  - DVT ppx: ASA   - Post op abx: Ancef  - WBS: WBAT  - PT  - Dispo: home with PT    Rebecca Landau, PGY-1  Orthopedic Surgery    Ortho Pager [phone]

## 2025-07-08 NOTE — OCCUPATIONAL THERAPY INITIAL EVALUATION ADULT - ADDITIONAL COMMENTS
PTA, pt able to perform all mobility/ADLs independently. Pt ambulates with SC as needed and owns a RW from previous surgery. Pt uses b/l AFOs to ambulate 2/2 b/l foot drop.

## 2025-07-08 NOTE — OCCUPATIONAL THERAPY INITIAL EVALUATION ADULT - MODALITIES TREATMENT COMMENTS
Pt able to perform bed mobility (I). Pt performed UB/LB dressing with supervision while seated at EOB, demo impaired dynamic balance and decreased functional reach, however no physical assist required. Pt performed sit<->stand with supervision, and demo ability to ambulate in room/hallway with Supervision using RW, demo decreased step length and impaired ability to weight shift due to impaired BLE strength, however no physical assist required and no LOB observed throughout. Pt ambulated back to room and performed toilet transfer/hygiene with supervision. Pt able to perform hand hygiene while stading at sink, with supervision. Pt returned to bed and left as found, +all lines, +call NALLELY thomas, RN Ute made aware of outcome. Pt would benefit from continued OT services to facilitate increased independence with functional mobility/ADLs.

## 2025-07-08 NOTE — PROGRESS NOTE ADULT - SUBJECTIVE AND OBJECTIVE BOX
Ortho Note    Pt seen and examined on morning rounds. Pt comfortable without complaints, pain controlled.  Denies CP, SOB, N/V, numbness/tingling     Vital Signs Last 24 Hrs  T(C): 36.8 (07-08-25 @ 05:29), Max: 36.8 (07-08-25 @ 05:29)  T(F): 98.2 (07-08-25 @ 05:29), Max: 98.2 (07-08-25 @ 05:29)  HR: 69 (07-08-25 @ 05:29) (69 - 69)  BP: 96/59 (07-08-25 @ 05:29) (96/59 - 96/59)  BP(mean): 71 (07-08-25 @ 05:29) (71 - 71)  RR: 18 (07-08-25 @ 05:29) (18 - 18)  SpO2: 99% (07-08-25 @ 05:29) (99% - 99%)  I&O's Summary    07 Jul 2025 07:01  -  08 Jul 2025 06:12  --------------------------------------------------------  IN: 1200 mL / OUT: 2450 mL / NET: -1250 mL        Physical Exam:  General: Pt Alert and oriented, NAD  DSG aquacel C/D/I  Pulses: +2DP  Sensation: intact to light touch bilateral lower extremities   Motor: TA 2-3/5 bilaterally, EHL 3-4/5 bilaterally, EHL/GS 4+/5 bilaterally.            A/P: 65yFemale POD#1 s/p right total hip replacement, anterior approach  - Stable  - Pain Control  - DVT ppx: ASA 81mg BID POD1  - Post op abx: ancef periop  - PT, WBS: WBAT  DISPO: HPT pending PT cl;jennifer Willson, PGY-5  Ortho Pager [phone]

## 2025-07-08 NOTE — OCCUPATIONAL THERAPY INITIAL EVALUATION ADULT - GENERAL OBSERVATIONS, REHAB EVAL
OT IE completed. Orders received, chart reviewed, pt cleared for OT by RN Ute. Pt received semi supine in bed, NAD, +heplock. Pt A&Ox4, agreeable to OT, and tolerated session well.

## 2025-07-08 NOTE — CONSULT NOTE ADULT - ASSESSMENT
Ms. Smitha Nazario is a 65/F with Anxiety disorder, chronic anemia, alopecia, tibial muscular dystrophy and osteoarthritis who presented with chronic right hip pain that failed conservative treatment and admitted for an elective right CAITLIN.    Recommendations:    #Right hip osteoarthritis  #Post op state  - management per orthopedics  - provide adequate analgesia, Incentive spirometry, mobilize with fall precautions, bowel regimen, DVT prophylaxis  - PT/OT    #Acute blood loss anemia on chronic anemia  - no reported bleeding symptoms  - Monitor Hgb; pre op Hgb 10.9  - can send iron, TIBC, ferritin, retic if not done recently  - reports up to date colonoscopy with last 3 years ago reportedly  - Transfuse for Hgb < 7  - Ensure Type and Screen available  - outpatient follow up with PCP for age appropriate health/cancer screening    #Anxiety disorder  - on Citalopram 20 mg daily    #Alopecia  - on Minoxidil 2.5 mg daily and Spironolactone 150 mg daily    #Tibial muscular dystrophy  - PT/OT  - fall precautions  - follows up with a multidisciplinary outpatient team    DVT ppx ASA 81 mg BID + SCDs  Dispo: HPT    Feel free to reach out for any questions. Recommendations discussed with primary team.

## 2025-07-08 NOTE — CONSULT NOTE ADULT - SUBJECTIVE AND OBJECTIVE BOX
ANTONIO LORA  65y/Female    Patient is a 65y old  Female who presents with a chief complaint of right hip pain (08 Jul 2025 10:54).    Ms. Antonio Lora is a 65/F with Anxiety disorder, chronic anemia, alopecia, tibial muscular dystrophy and osteoarthritis who presented with chronic right hip pain that failed conservative treatment and admitted for an elective right CAITLIN. Per H&P, "Patient reports constant hip pain that radiates down her right leg. She describes the pain as "throbbing" in nature without numbness/tingling. She reports having a left hip replacement 4 years ago that went well and without complications. She reports having muscular dystrophy which causes muscle weakness for her at baseline for which she uses bilateral leg braces to help her ambulate. She takes Tylenol at home for pain with minimal relief of symptoms. Pain persists despite conservative measures. Ambulates occasionally with the assistance of a cane".    Post operatively, she reports that her hip pain is well controlled on her current regimen. She denies headache, blurring of vision, sore throat, dizziness, vomiting, chest pain, dyspnea, abdominal pain, diarrhea, dysuria, itching, depressed mood.    REVIEW OF SYSTEMS:  The rest of the 12 systems reviewed and found negative except as mentioned above    PAST MEDICAL & SURGICAL HISTORY:  per HPI    PERSONAL SOCIAL HISTORY:  Denies smoking history or recreational drug use  Etoh use 3x per week (02 Jul 2025 10:11)    FAMILY HISTORY:  colon CA - father    HOME MEDICATIONS:  acetaminophen 500 mg oral tablet 2 tab(s) orally every 8 hours for pain control	  citalopram 20 mg oral tablet 1 tab(s) orally once a day	  minoxidil 2.5 milligram(s) once a day	  spironolactone 50 mg oral tablet 3 tab(s) orally once a day	  Vitamin D3 gummies daily	    MEDICATIONS  (STANDING):  acetaminophen     Tablet .. 1000 milliGRAM(s) Oral every 8 hours  aspirin enteric coated 81 milliGRAM(s) Oral every 12 hours  celecoxib 100 milliGRAM(s) Oral two times a day  citalopram 20 milliGRAM(s) Oral daily  lactated ringers. 1000 milliLiter(s) (80 mL/Hr) IV Continuous <Continuous>  minoxidil 2.5 milliGRAM(s) Oral daily  pantoprazole    Tablet 40 milliGRAM(s) Oral before breakfast  polyethylene glycol 3350 17 Gram(s) Oral at bedtime  senna 2 Tablet(s) Oral at bedtime  Tranexamic Acid 1000mg 1 Vial(s) 1 Vial(s) Topical once    MEDICATIONS  (PRN):  HYDROmorphone  Injectable 0.5 milliGRAM(s) IV Push every 15 minutes PRN breakthrough pain  HYDROmorphone  Injectable 0.5 milliGRAM(s) IV Push every 4 hours PRN breakthrough pain  magnesium hydroxide Suspension 30 milliLiter(s) Oral daily PRN Constipation  ondansetron Injectable 4 milliGRAM(s) IV Push every 6 hours PRN Nausea and/or Vomiting  traMADol 25 milliGRAM(s) Oral every 4 hours PRN Moderate Pain (4 - 6)  traMADol 50 milliGRAM(s) Oral every 4 hours PRN Severe Pain (7 - 10)    T(C): 36.5 (07-08-25 @ 08:20), Max: 36.9 (07-07-25 @ 15:51)  HR: 62 (07-08-25 @ 08:20) (56 - 72)  BP: 98/60 (07-08-25 @ 10:23) (87/52 - 105/66)  RR: 17 (07-08-25 @ 08:20) (12 - 18)  SpO2: 99% (07-08-25 @ 08:20) (94% - 99%)  Wt(kg): --Vital Signs Last 24 Hrs  T(C): 36.5 (08 Jul 2025 08:20), Max: 36.9 (07 Jul 2025 15:51)  T(F): 97.7 (08 Jul 2025 08:20), Max: 98.4 (07 Jul 2025 15:51)  HR: 62 (08 Jul 2025 08:20) (56 - 72)  BP: 98/60 (08 Jul 2025 10:23) (87/52 - 105/66)  BP(mean): 71 (08 Jul 2025 05:29) (68 - 74)  RR: 17 (08 Jul 2025 08:20) (12 - 18)  SpO2: 99% (08 Jul 2025 08:20) (94% - 99%)    Parameters below as of 08 Jul 2025 08:20  Patient On (Oxygen Delivery Method): room air    Oxygen Saturation Index= Unable to calculate   [Based on FiO2 = Unknown, SpO2 = 99(07/08/2025 08:20), MAP = Unknown]  Daily       PHYSICAL EXAM:  GENERAL: NAD  HEAD:  Atraumatic, Normocephalic  EYES: EOMI, conjunctiva and sclera clear  ENMT: Moist mucous membranes  NECK: Supple, No JVD  NERVOUS SYSTEM:  Alert & Oriented X3, Good concentration; Moves all extremities  CHEST/LUNG: Clear to auscultation bilaterally  HEART: Regular rate and rhythm; Normal S1 and S2  ABDOMEN: Soft, Nontender, Nondistended; Bowel sounds present  EXTREMITIES:  right hip dressing c/d/i; 2+ Peripheral Pulses  PSYCH: Normal mood and affect      LABS:  CBC   07-08-25 @ 06:50  Hematcorit 27.4  Hemoglobin 8.9  Mean Cell Hemoglobin 30.5  Platelet Count-Automated 178  RBC Count 2.92  Red Cell Distrib Width 12.6  Wbc Count 10.40    BMP  07-08-25 @ 06:50  Anion Gap. Serum 9  Blood Urea Nitrogen,Serm 26  Calcium, Total Serum 9.4  Carbon Dioxide, Serum 24  Chloride, Serum 104  Creatinine, Serum 0.94  eGFR in  --  eGFR in Non Afican American --  Gloucose, serum 126  Potassium, Serum 4.6  Sodium, Serum 137    CMP  07-08-25 @ 06:50  Charu Aminotransferase(ALT/SGPT)--  Albumin, Serum --  Alkaline Phosphatase, Serum --  Anion Gap, Serum 9  Aspartate Aminotransferase (AST/SGOT)--  Bilirubin Total, Serum --  Blood Urea Nitrogen, Serum 26  Calcium,Total Serum 9.4  Carbon Dioxide, Serum 24  Chloride, Serum 104  Creatinine, Serum 0.94  eGFR if  --  eGFR if Non African American --  Glucose, Serum 126  Potassium, Serum 4.6  Protein Total, Serum --  Sodium, Serum 137

## 2025-07-08 NOTE — DISCHARGE NOTE NURSING/CASE MANAGEMENT/SOCIAL WORK - PATIENT PORTAL LINK FT
You can access the FollowMyHealth Patient Portal offered by [facility] by registering at the following website: [email or web address]. By joining [employer]’s FollowMyHealth portal, you will also be able to view your health information using other applications (apps) compatible with our system.

## 2025-07-09 VITALS
OXYGEN SATURATION: 98 % | RESPIRATION RATE: 17 BRPM | DIASTOLIC BLOOD PRESSURE: 63 MMHG | SYSTOLIC BLOOD PRESSURE: 91 MMHG | TEMPERATURE: 99 F | HEART RATE: 69 BPM

## 2025-07-09 LAB
ANION GAP SERPL CALC-SCNC: 9 MMOL/L — SIGNIFICANT CHANGE UP (ref 5–17)
BUN SERPL-MCNC: 17 MG/DL — SIGNIFICANT CHANGE UP (ref 7–23)
CALCIUM SERPL-MCNC: 9.5 MG/DL — SIGNIFICANT CHANGE UP (ref 8.4–10.5)
CHLORIDE SERPL-SCNC: 105 MMOL/L — SIGNIFICANT CHANGE UP (ref 96–108)
CO2 SERPL-SCNC: 26 MMOL/L — SIGNIFICANT CHANGE UP (ref 22–31)
CREAT SERPL-MCNC: 0.79 MG/DL — SIGNIFICANT CHANGE UP (ref 0.5–1.3)
EGFR: 84 ML/MIN/1.73M2 — SIGNIFICANT CHANGE UP
EGFR: 84 ML/MIN/1.73M2 — SIGNIFICANT CHANGE UP
FERRITIN SERPL-MCNC: 118 NG/ML — SIGNIFICANT CHANGE UP (ref 13–330)
GLUCOSE SERPL-MCNC: 97 MG/DL — SIGNIFICANT CHANGE UP (ref 70–99)
HCT VFR BLD CALC: 26 % — LOW (ref 34.5–45)
HGB BLD-MCNC: 8.5 G/DL — LOW (ref 11.5–15.5)
IRON SATN MFR SERPL: 17 UG/DL — LOW (ref 30–160)
IRON SATN MFR SERPL: 8 % — LOW (ref 14–50)
MCHC RBC-ENTMCNC: 30.7 PG — SIGNIFICANT CHANGE UP (ref 27–34)
MCHC RBC-ENTMCNC: 32.7 G/DL — SIGNIFICANT CHANGE UP (ref 32–36)
MCV RBC AUTO: 93.9 FL — SIGNIFICANT CHANGE UP (ref 80–100)
NRBC # BLD AUTO: 0 K/UL — SIGNIFICANT CHANGE UP (ref 0–0)
NRBC # FLD: 0 K/UL — SIGNIFICANT CHANGE UP (ref 0–0)
NRBC BLD AUTO-RTO: 0 /100 WBCS — SIGNIFICANT CHANGE UP (ref 0–0)
PLATELET # BLD AUTO: 164 K/UL — SIGNIFICANT CHANGE UP (ref 150–400)
PMV BLD: 10.7 FL — SIGNIFICANT CHANGE UP (ref 7–13)
POTASSIUM SERPL-MCNC: 4.3 MMOL/L — SIGNIFICANT CHANGE UP (ref 3.5–5.3)
POTASSIUM SERPL-SCNC: 4.3 MMOL/L — SIGNIFICANT CHANGE UP (ref 3.5–5.3)
RBC # BLD: 2.77 M/UL — LOW (ref 3.8–5.2)
RBC # FLD: 12.9 % — SIGNIFICANT CHANGE UP (ref 10.3–14.5)
SODIUM SERPL-SCNC: 140 MMOL/L — SIGNIFICANT CHANGE UP (ref 135–145)
TIBC SERPL-MCNC: 204 UG/DL — LOW (ref 220–430)
TRANSFERRIN SERPL-MCNC: 185 MG/DL — LOW (ref 200–360)
UIBC SERPL-MCNC: 187 UG/DL — SIGNIFICANT CHANGE UP (ref 110–370)
WBC # BLD: 5.96 K/UL — SIGNIFICANT CHANGE UP (ref 3.8–10.5)
WBC # FLD AUTO: 5.96 K/UL — SIGNIFICANT CHANGE UP (ref 3.8–10.5)

## 2025-07-09 PROCEDURE — 97161 PT EVAL LOW COMPLEX 20 MIN: CPT

## 2025-07-09 PROCEDURE — 76000 FLUOROSCOPY <1 HR PHYS/QHP: CPT

## 2025-07-09 PROCEDURE — 97165 OT EVAL LOW COMPLEX 30 MIN: CPT

## 2025-07-09 PROCEDURE — 85027 COMPLETE CBC AUTOMATED: CPT

## 2025-07-09 PROCEDURE — 82728 ASSAY OF FERRITIN: CPT

## 2025-07-09 PROCEDURE — 83550 IRON BINDING TEST: CPT

## 2025-07-09 PROCEDURE — 80048 BASIC METABOLIC PNL TOTAL CA: CPT

## 2025-07-09 PROCEDURE — 84466 ASSAY OF TRANSFERRIN: CPT

## 2025-07-09 PROCEDURE — 83540 ASSAY OF IRON: CPT

## 2025-07-09 PROCEDURE — 36415 COLL VENOUS BLD VENIPUNCTURE: CPT

## 2025-07-09 PROCEDURE — 99233 SBSQ HOSP IP/OBS HIGH 50: CPT

## 2025-07-09 PROCEDURE — 97116 GAIT TRAINING THERAPY: CPT

## 2025-07-09 PROCEDURE — C1776: CPT

## 2025-07-09 RX ORDER — SPIRONOLACTONE 25 MG
3 TABLET ORAL
Qty: 0 | Refills: 0 | DISCHARGE

## 2025-07-09 RX ORDER — POLYETHYLENE GLYCOL 3350 17 G/17G
17 POWDER, FOR SOLUTION ORAL
Qty: 0 | Refills: 0 | DISCHARGE
Start: 2025-07-09

## 2025-07-09 RX ADMIN — Medication 81 MILLIGRAM(S): at 09:43

## 2025-07-09 RX ADMIN — Medication 40 MILLIGRAM(S): at 05:47

## 2025-07-09 RX ADMIN — Medication 2.5 MILLIGRAM(S): at 06:11

## 2025-07-09 RX ADMIN — TRAMADOL HYDROCHLORIDE 25 MILLIGRAM(S): 50 TABLET, FILM COATED ORAL at 06:10

## 2025-07-09 RX ADMIN — CELECOXIB 100 MILLIGRAM(S): 50 CAPSULE ORAL at 05:49

## 2025-07-09 RX ADMIN — Medication 1000 MILLIGRAM(S): at 05:48

## 2025-07-09 NOTE — PROGRESS NOTE ADULT - SUBJECTIVE AND OBJECTIVE BOX
Ortho Note    Pt seen and examined on morning rounds. Pt comfortable without complaints, pain controlled.  Denies CP, SOB, N/V, numbness/tingling     Vital Signs Last 24 Hrs  T(C): 37 (07-09-25 @ 05:39), Max: 37 (07-09-25 @ 05:39)  T(F): 98.6 (07-09-25 @ 05:39), Max: 98.6 (07-09-25 @ 05:39)  HR: 67 (07-09-25 @ 05:39) (67 - 67)  BP: 94/61 (07-09-25 @ 05:39) (94/61 - 94/61)  BP(mean): --  RR: 18 (07-09-25 @ 05:39) (18 - 18)  SpO2: 97% (07-09-25 @ 05:39) (97% - 97%)  I&O's Summary    07 Jul 2025 07:01  -  08 Jul 2025 07:00  --------------------------------------------------------  IN: 1200 mL / OUT: 2450 mL / NET: -1250 mL    08 Jul 2025 07:01  -  09 Jul 2025 06:21  --------------------------------------------------------  IN: 600 mL / OUT: 1000 mL / NET: -400 mL        Physical Exam:  General: AAOx3, NAD  Aquacel DSG C/D/I  Sensation: SILT grossly SPN/DPN/Saph/Adriana/Tib  Motor: 5/5 Q/HS/GS/FHL; 3/5 TA/EHL bilaterally consistent with pre-op baseline per patient  Pulses: 2+ DP                          8.9    10.40 )-----------( 178      ( 08 Jul 2025 06:50 )             27.4     07-08    137  |  104  |  26[H]  ----------------------------<  126[H]  4.6   |  24  |  0.94    Ca    9.4      08 Jul 2025 06:50        A/P: 65y Female POD #2 s/p anterior CAITLIN by Dr. ELIO AN, appreciate medicine recs  - Anemia - f/u with PCP  - Pain Control  - DVT ppx: ASA   - Post op abx: Ancef  - WBS: WBAT  - PT  - Dispo: home with PT    Juwan Willson, PGY-5  Ortho Pager [phone]

## 2025-07-09 NOTE — PROGRESS NOTE ADULT - ASSESSMENT
Ms. Smitha Nazario is a 65/F with Anxiety disorder, chronic anemia, alopecia, tibial muscular dystrophy and osteoarthritis who presented with chronic right hip pain that failed conservative treatment and admitted for an elective right CAITLIN.    Recommendations:    #Right hip osteoarthritis  #Post op state  - management per orthopedics  - provide adequate analgesia, Incentive spirometry, mobilize with fall precautions, bowel regimen, DVT prophylaxis  - PT/OT    #Acute blood loss anemia on chronic iron deficiency anemia  - no reported bleeding symptoms  - Monitor Hgb; pre op Hgb 10.9  - Tsat 8%; discussed iron supplementation with the patient and she would like to start taking ferrous sulfate 325 mg every other day with bowel regimen; patient educated about possible side effects and benefits of the medication  - reports up to date colonoscopy with last 3 years ago reportedly  - Transfuse for Hgb < 7  - Ensure Type and Screen available  - outpatient follow up with PCP for age appropriate health/cancer screening    #Anxiety disorder  - on Citalopram 20 mg daily    #Alopecia  - on Minoxidil 2.5 mg daily and Spironolactone 150 mg daily    #Tibial muscular dystrophy  - PT/OT  - fall precautions  - follows up with a multidisciplinary outpatient team    DVT ppx ASA 81 mg BID + SCDs  Dispo: HPT    Feel free to reach out for any questions. Recommendations discussed with primary team.

## 2025-07-09 NOTE — PROGRESS NOTE ADULT - SUBJECTIVE AND OBJECTIVE BOX
Ortho Progress Note      Surgery: POD # 2 s/p Right anterior CAITLIN     Patient seen and examined at bedside this AM   Pt reports pain controlled  Denies CP, SOB, N/V, numbness/tingling   voiding: without difficulty     Vital Signs Last 24 Hrs  T(C): 37 (07-09-25 @ 08:20), Max: 37 (07-09-25 @ 08:20)  T(F): 98.6 (07-09-25 @ 08:20), Max: 98.6 (07-09-25 @ 08:20)  HR: 69 (07-09-25 @ 08:20) (69 - 69)  BP: 91/63 (07-09-25 @ 08:20) (91/63 - 91/63)  BP(mean): --  RR: 17 (07-09-25 @ 08:20) (17 - 17)  SpO2: 98% (07-09-25 @ 08:20) (98% - 98%)      General: Pt Alert and oriented, NAD  Extremity: Right hip   Dressing: aqaucel C/D/I   Bilateral lower extremities warm, well perfused,  pulses symmetric, 2+   Sensation: intact to light touch bilaterally  Motor: quad/FHL/GS 4+/5 bilaterally, EHL 3/5 bilaterally TA 1/5 bilaterally       A/P: 65yFemale POD#2 s/p right anterior CAITLIN     - Labs and vitals reviewed, medically stable  - continue with pain control   - antibiotics: post op ancef completed  - DVT ppx: ASA 81mg BID   - WBS: WBAT   - PT/OT  - continue Incentive Spirometer, bowel regimen, GI ppx   - continue diet: Regular diet   - Dispo: d/c home today, optimized with PT     Ortho Pager [phone]

## 2025-07-09 NOTE — PROGRESS NOTE ADULT - SUBJECTIVE AND OBJECTIVE BOX
Patient is a 65y old  Female who presents with a chief complaint of right hip pain (09 Jul 2025 06:21).    Patient seen and examined today. She reports that her right hip pain is well controlled. She denies chest pain, dyspnea, dizziness, abdominal pain, fever, headache.    Allergies    MEDICATIONS  (STANDING):  acetaminophen     Tablet .. 1000 milliGRAM(s) Oral every 8 hours  aspirin enteric coated 81 milliGRAM(s) Oral every 12 hours  celecoxib 100 milliGRAM(s) Oral two times a day  citalopram 20 milliGRAM(s) Oral daily  lactated ringers. 1000 milliLiter(s) (80 mL/Hr) IV Continuous <Continuous>  minoxidil 2.5 milliGRAM(s) Oral daily  pantoprazole    Tablet 40 milliGRAM(s) Oral before breakfast  polyethylene glycol 3350 17 Gram(s) Oral at bedtime  senna 2 Tablet(s) Oral at bedtime  Tranexamic Acid 1000mg 1 Vial(s) 1 Vial(s) Topical once    MEDICATIONS  (PRN):  HYDROmorphone  Injectable 0.5 milliGRAM(s) IV Push every 15 minutes PRN breakthrough pain  HYDROmorphone  Injectable 0.5 milliGRAM(s) IV Push every 4 hours PRN breakthrough pain  magnesium hydroxide Suspension 30 milliLiter(s) Oral daily PRN Constipation  ondansetron Injectable 4 milliGRAM(s) IV Push every 6 hours PRN Nausea and/or Vomiting  traMADol 25 milliGRAM(s) Oral every 4 hours PRN Moderate Pain (4 - 6)  traMADol 50 milliGRAM(s) Oral every 4 hours PRN Severe Pain (7 - 10)    Vital Signs Last 24 Hrs  T(C): 37 (07-09-25 @ 08:20), Max: 37 (07-09-25 @ 05:39)  T(F): 98.6 (07-09-25 @ 08:20), Max: 98.6 (07-09-25 @ 05:39)  HR: 69 (07-09-25 @ 08:20) (67 - 76)  BP: 91/63 (07-09-25 @ 08:20) (91/63 - 95/60)  BP(mean): --  RR: 17 (07-09-25 @ 08:20) (17 - 18)  SpO2: 98% (07-09-25 @ 08:20) (97% - 98%)    I&O's Summary    08 Jul 2025 07:01  -  09 Jul 2025 07:00  --------------------------------------------------------  IN: 600 mL / OUT: 1000 mL / NET: -400 mL    09 Jul 2025 07:01  -  09 Jul 2025 11:37  --------------------------------------------------------  IN: 180 mL / OUT: 0 mL / NET: 180 mL    Oxygen Saturation Index= Unable to calculate   [Based on FiO2 = Unknown, SpO2 = 98(07/09/2025 08:20), MAP = Unknown]    PHYSICAL EXAM:  GENERAL: NAD  HEAD:  Atraumatic, Normocephalic  EYES: EOMI, conjunctiva and sclera clear  ENMT: Moist mucous membranes  NECK: Supple, No JVD  NERVOUS SYSTEM:  Alert & Oriented X3, Moves all extremities  CHEST/LUNG: Clear to auscultation bilaterally  HEART: Regular rate and rhythm; Normal S1 and S2  ABDOMEN: Soft, Nontender, Nondistended; Bowel sounds present  EXTREMITIES:  right hip dressing c/d/i; 2+ Peripheral Pulses  PSYCH: Normal mood and affect    Investigations:                        8.5    5.96  )-----------( 164      ( 09 Jul 2025 05:30 )             26.0     07-09    140  |  105  |  17  ----------------------------<  97  4.3   |  26  |  0.79    Ca    9.5      09 Jul 2025 05:30

## 2025-07-15 DIAGNOSIS — M16.11 UNILATERAL PRIMARY OSTEOARTHRITIS, RIGHT HIP: ICD-10-CM

## 2025-07-15 DIAGNOSIS — L65.9 NONSCARRING HAIR LOSS, UNSPECIFIED: ICD-10-CM

## 2025-07-15 DIAGNOSIS — Z91.018 ALLERGY TO OTHER FOODS: ICD-10-CM

## 2025-07-15 DIAGNOSIS — D50.9 IRON DEFICIENCY ANEMIA, UNSPECIFIED: ICD-10-CM

## 2025-07-15 DIAGNOSIS — Z79.891 LONG TERM (CURRENT) USE OF OPIATE ANALGESIC: ICD-10-CM

## 2025-07-15 DIAGNOSIS — G71.00 MUSCULAR DYSTROPHY, UNSPECIFIED: ICD-10-CM

## 2025-07-15 DIAGNOSIS — Z96.642 PRESENCE OF LEFT ARTIFICIAL HIP JOINT: ICD-10-CM

## 2025-07-15 DIAGNOSIS — Z79.82 LONG TERM (CURRENT) USE OF ASPIRIN: ICD-10-CM

## 2025-07-15 NOTE — DISCHARGE NOTE PROVIDER - NSDCCAREPROVSEEN_GEN_ALL_CORE_FT
"  Caller: Eugene Shabazz \"JT\"    Relationship: Self    Best call back number:[phone]     What is the best time to reach you: ANY    Who are you requesting to speak with (clinical staff, provider,  specific staff member): CLINICAL STAFF     Do you know the name of the person who called:      What was the call regarding: PATIENT S REQUESTING TO SCHEDULE PHYSICAL WITH A MALE PROVIDER. PATIENT'S PCP IS UNAVAILABLE FOR PHYSICALS UNTIL JANUARY.    Is it okay if the provider responds through [facility]: YES      "
Called made patient appointment   
Tavo Sahu

## 2025-07-16 ENCOUNTER — NON-APPOINTMENT (OUTPATIENT)
Age: 65
End: 2025-07-16

## 2025-07-16 ENCOUNTER — TRANSCRIPTION ENCOUNTER (OUTPATIENT)
Age: 65
End: 2025-07-16

## 2025-07-21 ENCOUNTER — APPOINTMENT (OUTPATIENT)
Dept: ORTHOPEDIC SURGERY | Facility: CLINIC | Age: 65
End: 2025-07-21
Payer: COMMERCIAL

## 2025-07-21 VITALS
DIASTOLIC BLOOD PRESSURE: 70 MMHG | HEIGHT: 65 IN | TEMPERATURE: 98.2 F | HEART RATE: 75 BPM | SYSTOLIC BLOOD PRESSURE: 104 MMHG | OXYGEN SATURATION: 98 % | WEIGHT: 110 LBS | BODY MASS INDEX: 18.33 KG/M2

## 2025-07-21 DIAGNOSIS — Z96.641 AFTERCARE FOLLOWING JOINT REPLACEMENT SURGERY: ICD-10-CM

## 2025-07-21 DIAGNOSIS — Z47.1 AFTERCARE FOLLOWING JOINT REPLACEMENT SURGERY: ICD-10-CM

## 2025-07-21 PROCEDURE — 99024 POSTOP FOLLOW-UP VISIT: CPT

## 2025-07-28 ENCOUNTER — APPOINTMENT (OUTPATIENT)
Dept: INTERNAL MEDICINE | Facility: CLINIC | Age: 65
End: 2025-07-28
Payer: COMMERCIAL

## 2025-07-28 DIAGNOSIS — D64.9 ANEMIA, UNSPECIFIED: ICD-10-CM

## 2025-07-28 PROCEDURE — 36415 COLL VENOUS BLD VENIPUNCTURE: CPT

## 2025-08-06 PROBLEM — D64.9 NORMOCYTIC ANEMIA: Status: ACTIVE | Noted: 2025-07-03

## 2025-08-21 ENCOUNTER — NON-APPOINTMENT (OUTPATIENT)
Age: 65
End: 2025-08-21

## 2025-08-25 ENCOUNTER — TRANSCRIPTION ENCOUNTER (OUTPATIENT)
Age: 65
End: 2025-08-25

## 2025-09-02 ENCOUNTER — TRANSCRIPTION ENCOUNTER (OUTPATIENT)
Age: 65
End: 2025-09-02

## 2025-09-03 ENCOUNTER — OUTPATIENT (OUTPATIENT)
Dept: OUTPATIENT SERVICES | Facility: HOSPITAL | Age: 65
LOS: 1 days | End: 2025-09-03
Payer: COMMERCIAL

## 2025-09-03 ENCOUNTER — APPOINTMENT (OUTPATIENT)
Dept: ORTHOPEDIC SURGERY | Facility: CLINIC | Age: 65
End: 2025-09-03
Payer: COMMERCIAL

## 2025-09-03 ENCOUNTER — RESULT REVIEW (OUTPATIENT)
Age: 65
End: 2025-09-03

## 2025-09-03 VITALS
OXYGEN SATURATION: 99 % | TEMPERATURE: 98.5 F | WEIGHT: 110 LBS | DIASTOLIC BLOOD PRESSURE: 75 MMHG | SYSTOLIC BLOOD PRESSURE: 114 MMHG | HEIGHT: 65 IN | BODY MASS INDEX: 18.33 KG/M2 | HEART RATE: 80 BPM

## 2025-09-03 DIAGNOSIS — M16.11 UNILATERAL PRIMARY OSTEOARTHRITIS, RIGHT HIP: ICD-10-CM

## 2025-09-03 DIAGNOSIS — Z47.1 AFTERCARE FOLLOWING JOINT REPLACEMENT SURGERY: ICD-10-CM

## 2025-09-03 DIAGNOSIS — Z96.641 AFTERCARE FOLLOWING JOINT REPLACEMENT SURGERY: ICD-10-CM

## 2025-09-03 DIAGNOSIS — Z96.642 PRESENCE OF LEFT ARTIFICIAL HIP JOINT: Chronic | ICD-10-CM

## 2025-09-03 PROCEDURE — 73502 X-RAY EXAM HIP UNI 2-3 VIEWS: CPT

## 2025-09-03 PROCEDURE — 99024 POSTOP FOLLOW-UP VISIT: CPT

## 2025-09-03 PROCEDURE — 73502 X-RAY EXAM HIP UNI 2-3 VIEWS: CPT | Mod: 26,RT

## 2025-09-09 ENCOUNTER — APPOINTMENT (OUTPATIENT)
Dept: INTERNAL MEDICINE | Facility: CLINIC | Age: 65
End: 2025-09-09

## (undated) DEVICE — ELCTR BOVIE PENCIL SMOKE EVACUATION

## (undated) DEVICE — DRAPE C ARM 41X74"

## (undated) DEVICE — SUT STRATAFIX SPIRAL MONOCRYL PLUS 3-0 30CM PS-1 UNDYED

## (undated) DEVICE — DRAPE SUPINE ESYSUIT

## (undated) DEVICE — WOUND IRR SURGIPHOR

## (undated) DEVICE — Device

## (undated) DEVICE — SUT STRATAFIX SPIRAL PDS PLUS 1 30X30CM OS-6 VIOLET

## (undated) DEVICE — PREP CHLORAPREP HI-LITE ORANGE 26ML

## (undated) DEVICE — PACK TOTAL HIP (2 PACKS)

## (undated) DEVICE — WOUND IRR IRRISEPT W 0.5 CHG

## (undated) DEVICE — NDL HYPO SAFE 22G X 1.5" (BLACK)

## (undated) DEVICE — DRAPE TOWEL BLUE 17" X 24"

## (undated) DEVICE — SUT STRATAFIX SPIRAL PDS PLUS 2-0 36X36CM CT-1 VIOLET

## (undated) DEVICE — DRAPE LIGHT HANDLE COVER (BLUE)

## (undated) DEVICE — SAW BLADE STRYKER SAGITTAL 81.5X12.5X1.19MM

## (undated) DEVICE — ELCTR STRYKER NEPTUNE SMOKE EVACUATION PENCIL (GREEN)

## (undated) DEVICE — WARMING BLANKET UPPER ADULT

## (undated) DEVICE — SPECIMEN CONTAINER 4OZ

## (undated) DEVICE — ELCTR GROUNDING PAD ADULT COVIDIEN

## (undated) DEVICE — ELCTR AQUAMANTYS BIPOLAR SEALER 6.0

## (undated) DEVICE — DRSG DERMABOND PRINEO 22CM

## (undated) DEVICE — PREP DURAPREP 26CC

## (undated) DEVICE — DRAPE LIGHT HANDLE COVER (GREEN)

## (undated) DEVICE — GLV 7.5 PROTEXIS ORTHO (CREAM)

## (undated) DEVICE — ELCTR BOVIE PENCIL BLADE 10FT

## (undated) DEVICE — DRSG COBAN 6"

## (undated) DEVICE — GLV 7 PROTEXIS ORTHO (CREAM)

## (undated) DEVICE — SUT ETHIBOND 1 30" OS8

## (undated) DEVICE — HOOD T7 PLUS PEELAWAY

## (undated) DEVICE — DRAPE 3/4 SHEET 52X76"

## (undated) DEVICE — SUT VICRYL 2-0 27" CT-1 UNDYED

## (undated) DEVICE — SUT VICRYL 0 36" CT-1 UNDYED

## (undated) DEVICE — DRSG AQUACEL 3.5 X 10"